# Patient Record
Sex: FEMALE | Race: BLACK OR AFRICAN AMERICAN | NOT HISPANIC OR LATINO | Employment: UNEMPLOYED | URBAN - METROPOLITAN AREA
[De-identification: names, ages, dates, MRNs, and addresses within clinical notes are randomized per-mention and may not be internally consistent; named-entity substitution may affect disease eponyms.]

---

## 2017-01-20 ENCOUNTER — OFFICE VISIT (OUTPATIENT)
Dept: OBSTETRICS AND GYNECOLOGY | Facility: CLINIC | Age: 27
End: 2017-01-20
Payer: COMMERCIAL

## 2017-01-20 VITALS
SYSTOLIC BLOOD PRESSURE: 122 MMHG | WEIGHT: 173.5 LBS | BODY MASS INDEX: 30.74 KG/M2 | DIASTOLIC BLOOD PRESSURE: 76 MMHG | HEIGHT: 63 IN

## 2017-01-20 DIAGNOSIS — Z87.42 HISTORY OF VAGINITIS: Primary | ICD-10-CM

## 2017-01-20 PROCEDURE — 99213 OFFICE O/P EST LOW 20 MIN: CPT | Mod: S$GLB,,, | Performed by: NURSE PRACTITIONER

## 2017-01-20 PROCEDURE — 1159F MED LIST DOCD IN RCRD: CPT | Mod: S$GLB,,, | Performed by: NURSE PRACTITIONER

## 2017-01-20 PROCEDURE — 99999 PR PBB SHADOW E&M-EST. PATIENT-LVL III: CPT | Mod: PBBFAC,,, | Performed by: NURSE PRACTITIONER

## 2017-01-20 PROCEDURE — 87480 CANDIDA DNA DIR PROBE: CPT

## 2017-01-20 NOTE — PROGRESS NOTES
"Corrine Daily is a 26 y.o. female  presents with complaint of vaginal itching and white discharge x 3 weeks. Denies itching or discharge today. Denies odor. Treated herself this week with OTC monistat and thinks it may have cleared up now. Reports seeing "white bumps" on the inside side wall of her vagina-not there anymore.  No other problems.     Past Medical History   Diagnosis Date    Anxiety     Depression      Past Surgical History   Procedure Laterality Date    Adenoidectomy  aroung [id number]     Social History   Substance Use Topics    Smoking status: Never Smoker    Smokeless tobacco: None    Alcohol use Yes      Comment: socially     Family History   Problem Relation Age of Onset    Adopted: Yes    Hypertension Neg Hx      OB History    Para Term  AB SAB TAB Ectopic Multiple Living   0 0 0 0 0 0 0 0 0 0             Visit Vitals    /76    Ht 5' 3" (1.6 m)    Wt 78.7 kg (173 lb 8 oz)    LMP 2017    BMI 30.73 kg/m2       ROS:  GENERAL: No fever, chills, fatigability or weight loss.  VULVAR: No pain, no lesions and no itching.  VAGINAL: No relaxation, no itching, no discharge, no abnormal bleeding and no lesions.  ABDOMEN: No abdominal pain. Denies nausea. Denies vomiting. No diarrhea. No constipation  BREAST: Denies pain. No lumps. No discharge.  URINARY: No incontinence, no nocturia, no frequency and no dysuria.  CARDIOVASCULAR: No chest pain. No shortness of breath. No leg cramps.  NEUROLOGICAL: No headaches. No vision changes.    PHYSICAL EXAM:  VULVA: normal appearing vulva with no masses, tenderness or lesions   VAGINA: normal appearing vagina with normal color. No abnormal discharge, no lesions   CERVIX: normal appearing cervix without discharge or lesions   UTERUS: uterus is normal size, shape, consistency and nontender   ADNEXA: normal adnexa in size, nontender and no masses    ASSESSMENT and PLAN:    ICD-10-CM ICD-9-CM    1. Vaginal itching L29.8 698.1 " Vaginosis Screen by DNA Probe     Affirm pending    Patient was counseled today on vaginitis prevention including :  a. avoiding feminine products such as deoderant soaps, body wash, bubble bath, douches, scented toilet paper, deoderant tampons or pads, feminine wipes, chronic pad use, etc.  b. avoiding other vulvovaginal irritants such as long hot baths, humidity, tight, synthetic clothing, chlorine and sitting around in wet bathing suits  c. wearing cotton underwear, avoiding thong underwear and no underwear to bed  d. taking showers instead of baths and use a hair dryer on cool setting afterwards to dry  e. wearing cotton to exercise and shower immediately after exercise and change clothes  f. using polyurethane condoms without spermicide if sexually active and symptoms are triggered by intercourse    FOLLOW UP: PRN lack of improvement.

## 2017-01-20 NOTE — MR AVS SNAPSHOT
[facility] [address]  [address]  [address]  [facility] [address]  Phone: [phone]  Fax: [phone]                  Corrine Daily   2017 3:20 PM   Office Visit    Description:  Female : 1990   Provider:  Debra Pino NP   Department:  [facility] [address]           Reason for Visit     Vaginitis                To Do List           Goals (5 Years of Data)     None      [facility]     [facility] On [facility] Nurse [facility] Line -  Assistance  Registered nurses in the [facility] On Call Center provide clinical advisement, health education, appointment booking, and other advisory services.  Call for this free service at [phone].             Medications           Message regarding Medications     Verify the changes and/or additions to your medication regime listed below are the same as discussed with your clinician today.  If any of these changes or additions are incorrect, please notify your healthcare provider.             Verify that the below list of medications is an accurate representation of the medications you are currently taking.  If none reported, the list may be blank. If incorrect, please contact your healthcare provider. Carry this list with you in case of emergency.           Current Medications     escitalopram oxalate (LEXAPRO) 10 MG tablet TK 1 T PO QD    levonorgestrel-ethinyl estradiol (TRIVORA, 28,) 50-30 (6)/75-40 (5)/125-30(10) per tablet Take 1 tablet by mouth once daily.    oxcarbazepine (TRILEPTAL) 150 MG Tab TK 1 T PO BID    oxcarbazepine (TRILEPTAL) 300 MG Tab TK 1 T PO QPM    azelastine (ASTELIN) 137 mcg (0.1 %) nasal spray 2 SPRAYS IEN BID    azithromycin (Z-SYL) 250 MG tablet TK UTD    ergocalciferol (ERGOCALCIFEROL) 50,000 unit Cap Take 1 capsule (50,000 Units total) by mouth every 30 days.    FLONASE ALLERGY RELIEF 50 mcg/actuation nasal spray 2 SPRAYS IEN BID FOR 30 DAYS MAY DECREASE TO ONCE A DAY AFTER 3 WEEKS     "hydrocodone-chlorpheniramine (TUSSIONEX) 10-8 mg/5 mL suspension     ibuprofen (ADVIL,MOTRIN) 800 MG tablet     NASONEX 50 mcg/actuation nasal spray     TRIVORA, 28, 50-30 (6)/75-40 (5)/125-30(10) per tablet TAKE ONE BY MOUTH EVERY DAY           Clinical Reference Information           Vital Signs - Last Recorded  Most recent update: 1/20/2017  3:30 PM by Michelle Ríos LPN    BP Ht Wt LMP BMI    122/76 5' 3" (1.6 m) 78.7 kg (173 lb 8 oz) 01/04/2017 30.73 kg/m2      Blood Pressure          Most Recent Value    BP  122/76      Allergies as of 1/20/2017     Penicillins    Prozac [Fluoxetine]      Immunizations Administered on Date of Encounter - 1/20/2017     None      "

## 2017-01-21 LAB
CANDIDA RRNA VAG QL PROBE: NEGATIVE
G VAGINALIS RRNA GENITAL QL PROBE: POSITIVE
T VAGINALIS RRNA GENITAL QL PROBE: NEGATIVE

## 2017-01-23 DIAGNOSIS — B96.89 BV (BACTERIAL VAGINOSIS): Primary | ICD-10-CM

## 2017-01-23 DIAGNOSIS — N76.0 BV (BACTERIAL VAGINOSIS): Primary | ICD-10-CM

## 2017-01-23 RX ORDER — TINIDAZOLE 500 MG/1
2 TABLET ORAL DAILY
Qty: 8 TABLET | Refills: 0 | Status: SHIPPED | OUTPATIENT
Start: 2017-01-23 | End: 2017-01-25

## 2017-10-22 DIAGNOSIS — Z30.41 ENCOUNTER FOR SURVEILLANCE OF CONTRACEPTIVE PILLS: ICD-10-CM

## 2017-10-23 RX ORDER — LEVONORGESTREL AND ETHINYL ESTRADIOL 6-5-10
KIT ORAL
Qty: 28 TABLET | Refills: 0 | Status: SHIPPED | OUTPATIENT
Start: 2017-10-23 | End: 2017-12-01 | Stop reason: SDUPTHER

## 2017-11-20 DIAGNOSIS — Z30.41 ENCOUNTER FOR SURVEILLANCE OF CONTRACEPTIVE PILLS: ICD-10-CM

## 2017-11-26 RX ORDER — LEVONORGESTREL AND ETHINYL ESTRADIOL 6-5-10
KIT ORAL
Qty: 28 TABLET | Refills: 1 | Status: SHIPPED | OUTPATIENT
Start: 2017-11-26 | End: 2018-01-09 | Stop reason: SDUPTHER

## 2017-12-01 ENCOUNTER — OFFICE VISIT (OUTPATIENT)
Dept: OBSTETRICS AND GYNECOLOGY | Facility: CLINIC | Age: 27
End: 2017-12-01
Payer: COMMERCIAL

## 2017-12-01 VITALS — HEIGHT: 63 IN | WEIGHT: 180.75 LBS | BODY MASS INDEX: 32.03 KG/M2

## 2017-12-01 DIAGNOSIS — Z30.41 ENCOUNTER FOR SURVEILLANCE OF CONTRACEPTIVE PILLS: ICD-10-CM

## 2017-12-01 DIAGNOSIS — Z30.9 ENCOUNTER FOR CONTRACEPTIVE MANAGEMENT, UNSPECIFIED TYPE: Primary | ICD-10-CM

## 2017-12-01 DIAGNOSIS — Z01.419 ENCOUNTER FOR GYNECOLOGICAL EXAMINATION WITHOUT ABNORMAL FINDING: ICD-10-CM

## 2017-12-01 DIAGNOSIS — Z11.3 SCREENING EXAMINATION FOR STD (SEXUALLY TRANSMITTED DISEASE): ICD-10-CM

## 2017-12-01 PROCEDURE — 99999 PR PBB SHADOW E&M-EST. PATIENT-LVL II: CPT | Mod: PBBFAC,,, | Performed by: OBSTETRICS & GYNECOLOGY

## 2017-12-01 PROCEDURE — 87591 N.GONORRHOEAE DNA AMP PROB: CPT

## 2017-12-01 PROCEDURE — 99395 PREV VISIT EST AGE 18-39: CPT | Mod: S$GLB,,, | Performed by: OBSTETRICS & GYNECOLOGY

## 2017-12-01 PROCEDURE — 87480 CANDIDA DNA DIR PROBE: CPT

## 2017-12-01 PROCEDURE — 87660 TRICHOMONAS VAGIN DIR PROBE: CPT

## 2017-12-01 RX ORDER — LEVONORGESTREL AND ETHINYL ESTRADIOL 6-5-10
1 KIT ORAL DAILY
Qty: 28 TABLET | Refills: 11 | Status: SHIPPED | OUTPATIENT
Start: 2017-12-01 | End: 2018-01-09

## 2017-12-01 NOTE — PROGRESS NOTES
HISTORY OF PRESENT ILLNESS:    Corrine Daily is a 27 y.o. female, , Patient's last menstrual period was 2017 (exact date).,  presents for a routine exam and has no complaints.  Patient is on current contraception without difficulties. Normal monthly cycles, no break through bleeding or bothersome side effects. She desires to continue current contraception.    Past Medical History:   Diagnosis Date    Anxiety     Depression        Past Surgical History:   Procedure Laterality Date    ADENOIDECTOMY  aroung [id number]     MEDICATIONS AND ALLERGIES:    Current Outpatient Prescriptions:     azelastine (ASTELIN) 137 mcg (0.1 %) nasal spray, 2 SPRAYS IEN BID, Disp: , Rfl: 0    escitalopram oxalate (LEXAPRO) 10 MG tablet, TK 1 T PO QD, Disp: , Rfl: 0    FLONASE ALLERGY RELIEF 50 mcg/actuation nasal spray, 2 SPRAYS IEN BID FOR 30 DAYS MAY DECREASE TO ONCE A DAY AFTER 3 WEEKS, Disp: , Rfl: 0    ibuprofen (ADVIL,MOTRIN) 800 MG tablet, , Disp: , Rfl: 3    levonorgestrel-ethinyl estradiol (TRIVORA, 28,) 50-30 (6)/75-40 (5)/125-30(10) per tablet, Take 1 tablet by mouth once daily., Disp: 28 tablet, Rfl: 11    oxcarbazepine (TRILEPTAL) 150 MG Tab, TK 1 T PO BID, Disp: , Rfl: 1    oxcarbazepine (TRILEPTAL) 300 MG Tab, TK 1 T PO QPM, Disp: , Rfl: 0    TRIVORA, 28, 50-30 (6)/75-40 (5)/125-30(10) per tablet, TAKE 1 TABLET BY MOUTH EVERY DAY, Disp: 28 tablet, Rfl: 1    azithromycin (Z-SYL) 250 MG tablet, TK UTD, Disp: , Rfl: 0    NASONEX 50 mcg/actuation nasal spray, , Disp: , Rfl: 0    Review of patient's allergies indicates:   Allergen Reactions    Penicillins      unknown    Prozac [fluoxetine] Other (See Comments)     Suicide thoughts       Family History   Problem Relation Age of Onset    Adopted: Yes    Hypertension Neg Hx     Breast cancer Neg Hx     Colon cancer Neg Hx     Ovarian cancer Neg Hx        Social History     Social History    Marital status: Single     Spouse name: N/A    Number  "of children: N/A    Years of education: N/A     Occupational History    Not on file.     Social History Main Topics    Smoking status: Never Smoker    Smokeless tobacco: Never Used    Alcohol use Yes      Comment: socially    Drug use:      Types: Marijuana    Sexual activity: Yes     Partners: Male     Birth control/ protection: OCP     Other Topics Concern    Not on file     Social History Narrative    No narrative on file     COMPREHENSIVE GYN HISTORY:  PAP History: Denies abnormal Paps.  Infection History: Denies STDs. Denies PID.  Benign History: Denies uterine fibroids. Denies ovarian cysts. Denies endometriosis. Denies other conditions.  Cancer History: Denies cervical cancer. Denies uterine cancer or hyperplasia. Denies ovarian cancer. Denies vulvar cancer or pre-cancer. Denies vaginal cancer or pre-cancer. Denies breast cancer. Denies colon cancer.  Sexual Activity History: Reports currently being sexually active  Menstrual History: Monthly. Mod then light flow.   Dysmenorrhea History: Reports mild dysmenorrhea.     ROS:  GENERAL: No weight changes. No swelling. No fatigue. No fever.  CARDIOVASCULAR: No chest pain. No shortness of breath. No leg cramps.   NEUROLOGICAL: No headaches. No vision changes.  BREASTS: No pain. No lumps. No discharge.  ABDOMEN: No pain. No nausea. No vomiting. No diarrhea. No constipation.  REPRODUCTIVE: No abnormal bleeding.   VULVA: No pain. No lesions. No itching.  VAGINA: No relaxation. No itching. No odor. No discharge. No lesions.  URINARY: No incontinence. No nocturia. No frequency. No dysuria.    Ht 5' 3" (1.6 m)   Wt 82 kg (180 lb 12.4 oz)   LMP 11/16/2017 (Exact Date)   BMI 32.02 kg/m²     PE:  APPEARANCE: Well nourished, well developed, in no acute distress.  AFFECT: WNL, alert and oriented x 3.  SKIN: No acne or hirsutism.  NECK: Neck symmetric, without masses or thyromegaly.  NODES: No inguinal, cervical, axillary or femoral lymph node enlargement.  CHEST: " Good respiratory effort.   ABDOMEN: Soft. No tenderness or masses. No hepatosplenomegaly. No hernias.  BREASTS: Symmetrical, no skin changes, visible lesions, palpable masses or nipple discharge bilaterally.  PELVIC: External female genitalia without lesions.  Female hair distribution. Adequate perineal body, Normal urethral meatus. Vagina moist and well rugated without lesions, white discharge.  No significant cystocele or rectocele present. Cervix pink without lesions, discharge or tenderness. Uterus is 4-6 week size, regular, mobile and nontender. Adnexa without masses or tenderness.  EXTREMITIES: No edema    DIAGNOSIS:  1. Encounter for contraceptive management, unspecified type    2. Encounter for gynecological examination without abnormal finding    3. Encounter for surveillance of contraceptive pills      PLAN:    Orders Placed This Encounter    levonorgestrel-ethinyl estradiol (TRIVORA, 28,) 50-30 (6)/75-40 (5)/125-30(10) per tablet     COUNSELING:  The patient was counseled today on:  -A.C.S. Pap and pelvic exam guidelines (pap every 3 years), recomendations for yearly mammogram;  -to follow up with her PCP for other health maintenance.    FOLLOW-UP with me annually.

## 2017-12-04 LAB
C TRACH DNA SPEC QL NAA+PROBE: DETECTED
CANDIDA RRNA VAG QL PROBE: NEGATIVE
G VAGINALIS RRNA GENITAL QL PROBE: POSITIVE
N GONORRHOEA DNA SPEC QL NAA+PROBE: NOT DETECTED
T VAGINALIS RRNA GENITAL QL PROBE: NEGATIVE

## 2017-12-05 ENCOUNTER — TELEPHONE (OUTPATIENT)
Dept: OBSTETRICS AND GYNECOLOGY | Facility: CLINIC | Age: 27
End: 2017-12-05

## 2017-12-05 DIAGNOSIS — N76.0 ACUTE VAGINITIS: Primary | ICD-10-CM

## 2017-12-05 RX ORDER — METRONIDAZOLE 7.5 MG/G
1 GEL VAGINAL DAILY
Qty: 1 G | Refills: 0 | Status: SHIPPED | OUTPATIENT
Start: 2017-12-05 | End: 2017-12-12

## 2017-12-05 RX ORDER — AZITHROMYCIN 500 MG/1
500 TABLET, FILM COATED ORAL ONCE
Qty: 4 TABLET | Refills: 0 | Status: SHIPPED | OUTPATIENT
Start: 2017-12-05 | End: 2017-12-05

## 2017-12-05 NOTE — TELEPHONE ENCOUNTER
Patient has Bacterial vaginosis.  Plan:  Metrogel Disp one tube, one applicatorful qhs for 5 nights sent to the pharmacy.    Please  patient on vaginitis prevention including :  a. avoiding feminine products such as deoderant soaps, body wash, bubble bath, douches, scented toilet paper, deoderant tampons or pads, feminine wipes, chronic pad use, etc.  b. avoiding other vulvovaginal irritants such as long hot baths, humidity, tight, synthetic clothing, chlorine and sitting around in wet bathing suits  c. wearing cotton underwear, avoiding thong underwear and no underwear to bed  d. taking showers instead of baths and use a hair dryer on cool setting afterwards to dry  e. wearing cotton to exercise and shower immediately after exercise and change clothes  f. using polyurethane condoms without spermicide if sexually active and symptoms are triggered by intercourse      +CT-Zithromax 250 mg disp 4 take all 4 pills at one time. No refills. Please contact patient to inform, enter pharmacy information and send back as a prescription request.     Spouse or partner should be evaluated & treated for infection. Patient should abstain form sex for 2 weeks after both she and partner have been treated.     Please schedule appt for patient to be seen to in 4 weeks for LA.     Chlamydia  Chlamydia is a very common sexually transmitted disease (STD). Most people do not have symptoms. Because of this, chlamydia may not be noticed until it causes severe problems. Left untreated, this infection can cause women and men to become sterile. This means they will not be able to have children.

## 2017-12-06 NOTE — TELEPHONE ENCOUNTER
I spoke to  pt and she started the medicine already. Pt is moving away and we scheduled her test to cure apt before she leaves

## 2017-12-18 ENCOUNTER — PATIENT MESSAGE (OUTPATIENT)
Dept: OBSTETRICS AND GYNECOLOGY | Facility: CLINIC | Age: 27
End: 2017-12-18

## 2018-01-09 ENCOUNTER — OFFICE VISIT (OUTPATIENT)
Dept: OBSTETRICS AND GYNECOLOGY | Facility: CLINIC | Age: 28
End: 2018-01-09
Payer: COMMERCIAL

## 2018-01-09 VITALS
HEIGHT: 63 IN | BODY MASS INDEX: 32.35 KG/M2 | SYSTOLIC BLOOD PRESSURE: 112 MMHG | WEIGHT: 182.56 LBS | DIASTOLIC BLOOD PRESSURE: 72 MMHG

## 2018-01-09 DIAGNOSIS — Z11.3 SCREEN FOR STD (SEXUALLY TRANSMITTED DISEASE): Primary | ICD-10-CM

## 2018-01-09 DIAGNOSIS — Z86.19 HISTORY OF CHLAMYDIA: ICD-10-CM

## 2018-01-09 DIAGNOSIS — Z30.41 ENCOUNTER FOR SURVEILLANCE OF CONTRACEPTIVE PILLS: ICD-10-CM

## 2018-01-09 PROCEDURE — 99213 OFFICE O/P EST LOW 20 MIN: CPT | Mod: S$GLB,,, | Performed by: NURSE PRACTITIONER

## 2018-01-09 PROCEDURE — 87491 CHLMYD TRACH DNA AMP PROBE: CPT

## 2018-01-09 PROCEDURE — 99999 PR PBB SHADOW E&M-EST. PATIENT-LVL III: CPT | Mod: PBBFAC,,, | Performed by: NURSE PRACTITIONER

## 2018-01-09 RX ORDER — LEVONORGESTREL AND ETHINYL ESTRADIOL 6-5-10
1 KIT ORAL DAILY
Qty: 28 TABLET | Refills: 10 | Status: SHIPPED | OUTPATIENT
Start: 2018-01-09 | End: 2018-09-17 | Stop reason: SDUPTHER

## 2018-01-09 NOTE — PROGRESS NOTES
CC: LA    HPI: Pt is a 27 y.o.  female who presents for chlamydia LA. Dx and treated for chlamydia in December. No problems since. Pt is moving to [place] this week for school. Requesting that her OCP rx be changed to a pharmacy there.       ROS:  GENERAL: Feeling well overall. Denies fever or chills.   SKIN: Denies rash or lesions.   HEAD: Denies head injury or headache.   NODES: Denies enlarged lymph nodes.   CHEST: Denies chest pain or shortness of breath.   CARDIOVASCULAR: Denies palpitations or left sided chest pain.   ABDOMEN: No abdominal pain, constipation, diarrhea, nausea, vomiting or rectal bleeding.   URINARY: No dysuria, hematuria, or burning on urination.  REPRODUCTIVE: See HPI.   BREASTS: Denies pain, lumps, or nipple discharge.   HEMATOLOGIC: No easy bruisability or excessive bleeding.   MUSCULOSKELETAL: Denies joint pain or swelling.   NEUROLOGIC: Denies syncope or weakness.   PSYCHIATRIC: Denies depression, anxiety or mood swings.    PE:   APPEARANCE: Well nourished, well developed, Black or  female in no acute distress.  VULVA: No lesions. Normal external female genitalia.  URETHRAL MEATUS: Normal size and location, no lesions, no prolapse.  URETHRA: No masses, tenderness, or prolapse.  VAGINA: Moist. No lesions or lacerations noted. No abnormal discharge present. No odor present.   CERVIX: No lesions or discharge. No cervical motion tenderness.   UTERUS: Normal size, regular shape, mobile, non-tender.  ADNEXA: No tenderness. No fullness or masses palpated in the adnexal regions.   ANUS PERINEUM: Normal.      Diagnosis:  1. Screen for STD (sexually transmitted disease)    2. History of chlamydia        Plan:     Orders Placed This Encounter    C. trachomatis/N. gonorrhoeae by AMP DNA Cervix     -affirm pending  -OCP rx sent to new pharmacy

## 2018-01-11 LAB
C TRACH DNA SPEC QL NAA+PROBE: NOT DETECTED
N GONORRHOEA DNA SPEC QL NAA+PROBE: NOT DETECTED

## 2018-09-17 DIAGNOSIS — Z30.41 ENCOUNTER FOR SURVEILLANCE OF CONTRACEPTIVE PILLS: ICD-10-CM

## 2018-11-02 ENCOUNTER — TELEPHONE (OUTPATIENT)
Dept: FAMILY MEDICINE | Facility: CLINIC | Age: 28
End: 2018-11-02

## 2018-11-02 NOTE — TELEPHONE ENCOUNTER
Pt advised of overdue pap smear, and states she moved and will schedule it with OBGYN [place].

## 2019-01-07 DIAGNOSIS — Z30.41 ENCOUNTER FOR SURVEILLANCE OF CONTRACEPTIVE PILLS: ICD-10-CM

## 2024-01-23 ENCOUNTER — OFFICE VISIT (OUTPATIENT)
Dept: OBSTETRICS AND GYNECOLOGY | Facility: CLINIC | Age: 34
End: 2024-01-23
Attending: OBSTETRICS & GYNECOLOGY
Payer: OTHER GOVERNMENT

## 2024-01-23 ENCOUNTER — LAB VISIT (OUTPATIENT)
Dept: LAB | Facility: OTHER | Age: 34
End: 2024-01-23
Attending: OBSTETRICS & GYNECOLOGY
Payer: OTHER GOVERNMENT

## 2024-01-23 VITALS
DIASTOLIC BLOOD PRESSURE: 70 MMHG | SYSTOLIC BLOOD PRESSURE: 110 MMHG | WEIGHT: 177.25 LBS | BODY MASS INDEX: 32.62 KG/M2 | HEIGHT: 62 IN

## 2024-01-23 DIAGNOSIS — Z01.419 WELL FEMALE EXAM WITH ROUTINE GYNECOLOGICAL EXAM: ICD-10-CM

## 2024-01-23 DIAGNOSIS — Z01.419 WELL FEMALE EXAM WITH ROUTINE GYNECOLOGICAL EXAM: Primary | ICD-10-CM

## 2024-01-23 DIAGNOSIS — Z97.5 BREAKTHROUGH BLEEDING ON NEXPLANON: ICD-10-CM

## 2024-01-23 DIAGNOSIS — N93.9 VAGINAL BLEEDING: ICD-10-CM

## 2024-01-23 DIAGNOSIS — N92.1 BREAKTHROUGH BLEEDING ON NEXPLANON: ICD-10-CM

## 2024-01-23 DIAGNOSIS — N93.9 ABNORMAL UTERINE BLEEDING: ICD-10-CM

## 2024-01-23 LAB
ALBUMIN SERPL BCP-MCNC: 4.3 G/DL (ref 3.5–5.2)
ALP SERPL-CCNC: 50 U/L (ref 55–135)
ALT SERPL W/O P-5'-P-CCNC: 17 U/L (ref 10–44)
ANION GAP SERPL CALC-SCNC: 4 MMOL/L (ref 8–16)
AST SERPL-CCNC: 25 U/L (ref 10–40)
B-HCG UR QL: NEGATIVE
BASOPHILS # BLD AUTO: 0.02 K/UL (ref 0–0.2)
BASOPHILS NFR BLD: 0.3 % (ref 0–1.9)
BILIRUB SERPL-MCNC: 0.5 MG/DL (ref 0.1–1)
BUN SERPL-MCNC: 11 MG/DL (ref 6–20)
CALCIUM SERPL-MCNC: 9.7 MG/DL (ref 8.7–10.5)
CHLORIDE SERPL-SCNC: 107 MMOL/L (ref 95–110)
CO2 SERPL-SCNC: 28 MMOL/L (ref 23–29)
CREAT SERPL-MCNC: 0.9 MG/DL (ref 0.5–1.4)
CTP QC/QA: YES
DIFFERENTIAL METHOD BLD: NORMAL
EOSINOPHIL # BLD AUTO: 0.2 K/UL (ref 0–0.5)
EOSINOPHIL NFR BLD: 2.5 % (ref 0–8)
ERYTHROCYTE [DISTWIDTH] IN BLOOD BY AUTOMATED COUNT: 12.6 % (ref 11.5–14.5)
EST. GFR  (NO RACE VARIABLE): >60 ML/MIN/1.73 M^2
GLUCOSE SERPL-MCNC: 88 MG/DL (ref 70–110)
HCT VFR BLD AUTO: 39.6 % (ref 37–48.5)
HGB BLD-MCNC: 12.8 G/DL (ref 12–16)
IMM GRANULOCYTES # BLD AUTO: 0.01 K/UL (ref 0–0.04)
IMM GRANULOCYTES NFR BLD AUTO: 0.2 % (ref 0–0.5)
LYMPHOCYTES # BLD AUTO: 2 K/UL (ref 1–4.8)
LYMPHOCYTES NFR BLD: 33.2 % (ref 18–48)
MCH RBC QN AUTO: 27.8 PG (ref 27–31)
MCHC RBC AUTO-ENTMCNC: 32.3 G/DL (ref 32–36)
MCV RBC AUTO: 86 FL (ref 82–98)
MONOCYTES # BLD AUTO: 0.6 K/UL (ref 0.3–1)
MONOCYTES NFR BLD: 10.1 % (ref 4–15)
NEUTROPHILS # BLD AUTO: 3.3 K/UL (ref 1.8–7.7)
NEUTROPHILS NFR BLD: 53.7 % (ref 38–73)
NRBC BLD-RTO: 0 /100 WBC
PLATELET # BLD AUTO: 279 K/UL (ref 150–450)
PMV BLD AUTO: 9.6 FL (ref 9.2–12.9)
POTASSIUM SERPL-SCNC: 4.4 MMOL/L (ref 3.5–5.1)
PROT SERPL-MCNC: 7.7 G/DL (ref 6–8.4)
RBC # BLD AUTO: 4.61 M/UL (ref 4–5.4)
SODIUM SERPL-SCNC: 139 MMOL/L (ref 136–145)
T4 FREE SERPL-MCNC: 1.1 NG/DL (ref 0.71–1.51)
TSH SERPL DL<=0.005 MIU/L-ACNC: 0.79 UIU/ML (ref 0.4–4)
WBC # BLD AUTO: 6.11 K/UL (ref 3.9–12.7)

## 2024-01-23 PROCEDURE — 36415 COLL VENOUS BLD VENIPUNCTURE: CPT | Performed by: OBSTETRICS & GYNECOLOGY

## 2024-01-23 PROCEDURE — 84443 ASSAY THYROID STIM HORMONE: CPT | Performed by: OBSTETRICS & GYNECOLOGY

## 2024-01-23 PROCEDURE — 99385 PREV VISIT NEW AGE 18-39: CPT | Mod: S$PBB,,, | Performed by: OBSTETRICS & GYNECOLOGY

## 2024-01-23 PROCEDURE — 84439 ASSAY OF FREE THYROXINE: CPT | Performed by: OBSTETRICS & GYNECOLOGY

## 2024-01-23 PROCEDURE — 85025 COMPLETE CBC W/AUTO DIFF WBC: CPT | Performed by: OBSTETRICS & GYNECOLOGY

## 2024-01-23 PROCEDURE — 88175 CYTOPATH C/V AUTO FLUID REDO: CPT | Performed by: PATHOLOGY

## 2024-01-23 PROCEDURE — 80053 COMPREHEN METABOLIC PANEL: CPT | Performed by: OBSTETRICS & GYNECOLOGY

## 2024-01-23 PROCEDURE — 88141 CYTOPATH C/V INTERPRET: CPT | Mod: ,,, | Performed by: PATHOLOGY

## 2024-01-23 PROCEDURE — 81025 URINE PREGNANCY TEST: CPT | Mod: PBBFAC | Performed by: OBSTETRICS & GYNECOLOGY

## 2024-01-23 PROCEDURE — 99999 PR PBB SHADOW E&M-NEW PATIENT-LVL III: CPT | Mod: PBBFAC,,, | Performed by: OBSTETRICS & GYNECOLOGY

## 2024-01-23 PROCEDURE — 99203 OFFICE O/P NEW LOW 30 MIN: CPT | Mod: PBBFAC | Performed by: OBSTETRICS & GYNECOLOGY

## 2024-01-23 PROCEDURE — 99999PBSHW POCT URINE PREGNANCY: Mod: PBBFAC,,,

## 2024-01-23 NOTE — PROGRESS NOTES
SUBJECTIVE:   33 y.o. female   for routine gyn exam. Patient's last menstrual period was 2023..  She has nexplanon sariah. Did well with bleeding the first year or so. But has had intermittent/ daily bleeding for about 8 weeks anow. - declines STD screen         Past Medical History:   Diagnosis Date    Anxiety     Depression      Past Surgical History:   Procedure Laterality Date    ADENOIDECTOMY  aroung [id number]     SECTION  2020    twins, PTD at 33 wga     Social History     Socioeconomic History    Marital status:    Tobacco Use    Smoking status: Some Days     Current packs/day: 0.25     Types: Cigarettes, Vaping with nicotine     Passive exposure: Current    Smokeless tobacco: Never   Substance and Sexual Activity    Alcohol use: Yes     Comment: socially    Drug use: Yes     Types: Marijuana    Sexual activity: Yes     Partners: Male     Birth control/protection: Implant     Comment: Nexplanon 2022     Family History   Adopted: Yes   Problem Relation Age of Onset    Hypertension Neg Hx     Breast cancer Neg Hx     Colon cancer Neg Hx     Ovarian cancer Neg Hx      OB History    Para Term  AB Living   3 1   1 2 2   SAB IAB Ectopic Multiple Live Births   1 1   1 2      # Outcome Date GA Lbr Shay/2nd Weight Sex Delivery Anes PTL Lv   3 IAB      TAB      2A  2020 33w0d   M CS-Unspec   CASIE      Complications: Fetal Intolerance   2B  2020 33w0d   M CS-Unspec   CASIE   1 SAB 2019     SAB            Current Outpatient Medications   Medication Sig Dispense Refill    escitalopram oxalate (LEXAPRO) 10 MG tablet TK 1 T PO QD  0     No current facility-administered medications for this visit.     Allergies: Penicillins and Prozac [fluoxetine]     ROS:  Constitutional: no weight loss, weight gain, fever, fatigue  Eyes:  No vision changes, glasses/contacts  ENT/Mouth: No ulcers, sinus problems, ears ringing, headache  Cardiovascular: No inability to lie  "flat, chest pain, exercise intolerance, swelling, heart palpitations  Respiratory: No wheezing, coughing blood, shortness of breath, or cough  Gastrointestinal: No diarrhea, bloody stool, nausea/vomiting, constipation, gas, hemorrhoids  Genitourinary: No blood in urine, painful urination, urgency of urination, frequency of urination, incomplete emptying, incontinence, +abnormal bleeding, painful periods, heavy periods, vaginal discharge, vaginal odor, painful intercourse, sexual problems, bleeding after intercourse.  Musculoskeletal: No muscle weakness  Skin/Breast: No painful breasts, nipple discharge, masses, rash, ulcers  Neurological: No passing out, seizures, numbness, headache  Endocrine: No diabetes, hypothyroid, hyperthyroid, hot flashes, hair loss, abnormal hair growth, acne  Psychiatric: No depression, crying  Hematologic: No bruises, bleeding, swollen lymph nodes, anemia.      OBJECTIVE:   The patient appears well, alert, oriented x 3, in no distress.  /70 (BP Location: Right arm, Patient Position: Sitting, BP Method: Medium (Manual))   Ht 5' 2" (1.575 m)   Wt 80.4 kg (177 lb 4 oz)   LMP 12/01/2023 Comment: Nexplanon 03/2022/outside facility  BMI 32.42 kg/m²   NECK: no thyromegaly, trachea midline  SKIN: no acne, striae, hirsutism  CHEST: CTAB  CV: RRR  BREAST EXAM: breasts appear normal, no suspicious masses, no skin or nipple changes or axillary nodes  ABDOMEN: no hernias, masses, or hepatosplenomegaly  GENITALIA: normal external genitalia, no erythema, no discharge  URETHRA: normal urethra, normal urethral meatus  VAGINA: Normal  CERVIX: no lesions or cervical motion tenderness  UTERUS: normal size, contour, position, consistency, mobility, non-tender  ADNEXA: no mass, fullness, tenderness      ASSESSMENT:   1. Well female exam with routine gynecological exam  Liquid-Based Pap Smear, Screening    CBC Auto Differential    TSH    Comprehensive Metabolic Panel    T4, Free      2. Breakthrough " bleeding on Nexplanon        3. Vaginal bleeding  POCT urine pregnancy      4. Abnormal uterine bleeding  CBC Auto Differential    TSH    Comprehensive Metabolic Panel    T4, Free    US Pelvis Comp with Transvag NON-OB (xpd          PLAN:   Orders Placed This Encounter    US Pelvis Comp with Transvag NON-OB (xpd    CBC Auto Differential    TSH    Comprehensive Metabolic Panel    T4, Free    POCT urine pregnancy    Liquid-Based Pap Smear, Screening     Discussed AUB, possible BTB on nexplanon. Plan labs and u/s. Then will f./u to discuss mgt options.  Discussed healthy lifestyle including regular exercise, healthy eating, etc.  Return to clinic in 1 year

## 2024-01-27 ENCOUNTER — HOSPITAL ENCOUNTER (OUTPATIENT)
Dept: RADIOLOGY | Facility: OTHER | Age: 34
Discharge: HOME | End: 2024-01-27
Attending: OBSTETRICS & GYNECOLOGY
Payer: OTHER GOVERNMENT

## 2024-01-27 DIAGNOSIS — N93.9 ABNORMAL UTERINE BLEEDING: ICD-10-CM

## 2024-01-27 PROCEDURE — 76856 US EXAM PELVIC COMPLETE: CPT | Mod: 26,,, | Performed by: RADIOLOGY

## 2024-01-27 PROCEDURE — 76830 TRANSVAGINAL US NON-OB: CPT | Mod: 26,,, | Performed by: RADIOLOGY

## 2024-01-27 PROCEDURE — 76830 TRANSVAGINAL US NON-OB: CPT | Mod: TC

## 2024-01-29 ENCOUNTER — PATIENT MESSAGE (OUTPATIENT)
Dept: OBSTETRICS AND GYNECOLOGY | Facility: CLINIC | Age: 34
End: 2024-01-29
Payer: OTHER GOVERNMENT

## 2024-01-29 LAB
FINAL PATHOLOGIC DIAGNOSIS: ABNORMAL
Lab: ABNORMAL

## 2024-01-31 ENCOUNTER — OFFICE VISIT (OUTPATIENT)
Dept: OBSTETRICS AND GYNECOLOGY | Facility: CLINIC | Age: 34
End: 2024-01-31
Attending: OBSTETRICS & GYNECOLOGY
Payer: OTHER GOVERNMENT

## 2024-01-31 DIAGNOSIS — Z97.5 BREAKTHROUGH BLEEDING ON NEXPLANON: Primary | ICD-10-CM

## 2024-01-31 DIAGNOSIS — N92.1 BREAKTHROUGH BLEEDING ON NEXPLANON: Primary | ICD-10-CM

## 2024-01-31 DIAGNOSIS — R87.615 UNSATISFACTORY CERVICAL PAPANICOLAOU SMEAR: ICD-10-CM

## 2024-01-31 PROBLEM — Z86.19 HISTORY OF CHLAMYDIA: Status: RESOLVED | Noted: 2018-01-09 | Resolved: 2024-01-31

## 2024-01-31 PROCEDURE — 99213 OFFICE O/P EST LOW 20 MIN: CPT | Mod: 95,,, | Performed by: OBSTETRICS & GYNECOLOGY

## 2024-01-31 NOTE — PROGRESS NOTES
The patient location is: [place]  The chief complaint leading to consultation is: AUB  Visit type: Virtual visit with synchronous audio and video  Total time spent with patient: 25 minutes  Each patient to whom he or she provides medical services by telemedicine is:  (1) informed of the relationship between the physician and patient and the respective role of any other health care provider with respect to management of the patient; and (2) notified that he or she may decline to receive medical services by telemedicine and may withdraw from such care at any time.      SUBJECTIVE:   33 y.o. female   for AUB. Patient's last menstrual period was 2023..  She has nexplanon sariah since 3-2022. Did well with bleeding the first year or so. But has had intermittent/ daily bleeding for about 8 weeks.  Bleeding has since stopped. - declines STD screen.  Needs contraception.  Unsatisfactory PAP . Normal CBC and TSH. Pelvic u/s shows 4 mm em stripe         Past Medical History:   Diagnosis Date    Anxiety     Depression      Past Surgical History:   Procedure Laterality Date    ADENOIDECTOMY  aroung [id number]     SECTION  2020    twins, PTD at 33 wga     Social History     Socioeconomic History    Marital status:    Tobacco Use    Smoking status: Some Days     Current packs/day: 0.25     Types: Cigarettes, Vaping with nicotine     Passive exposure: Current    Smokeless tobacco: Never   Substance and Sexual Activity    Alcohol use: Yes     Comment: socially    Drug use: Yes     Types: Marijuana    Sexual activity: Yes     Partners: Male     Birth control/protection: Implant     Comment: Nexplanon 2022     Family History   Adopted: Yes   Problem Relation Age of Onset    Hypertension Neg Hx     Breast cancer Neg Hx     Colon cancer Neg Hx     Ovarian cancer Neg Hx      OB History    Para Term  AB Living   3 1   1 2 2   SAB IAB Ectopic Multiple Live Births   1 1   1 2       # Outcome Date GA Lbr Shay/2nd Weight Sex Delivery Anes PTL Lv   3 IAB      TAB      2A  2020 33w0d   M CS-Unspec   CASIE      Complications: Fetal Intolerance   2B  2020 33w0d   M CS-Unspec   CASIE   1 2019     SAB            Current Outpatient Medications   Medication Sig Dispense Refill    escitalopram oxalate (LEXAPRO) 10 MG tablet TK 1 T PO QD  0     No current facility-administered medications for this visit.     Allergies: Penicillins and Prozac [fluoxetine]     ROS:  Constitutional: no weight loss, weight gain, fever, fatigue  Eyes:  No vision changes, glasses/contacts  ENT/Mouth: No ulcers, sinus problems, ears ringing, headache  Cardiovascular: No inability to lie flat, chest pain, exercise intolerance, swelling, heart palpitations  Respiratory: No wheezing, coughing blood, shortness of breath, or cough  Gastrointestinal: No diarrhea, bloody stool, nausea/vomiting, constipation, gas, hemorrhoids  Genitourinary: No blood in urine, painful urination, urgency of urination, frequency of urination, incomplete emptying, incontinence, +abnormal bleeding, painful periods, heavy periods, vaginal discharge, vaginal odor, painful intercourse, sexual problems, bleeding after intercourse.  Musculoskeletal: No muscle weakness  Skin/Breast: No painful breasts, nipple discharge, masses, rash, ulcers  Neurological: No passing out, seizures, numbness, headache  Endocrine: No diabetes, hypothyroid, hyperthyroid, hot flashes, hair loss, abnormal hair growth, ance  Psychiatric: No depression, crying  Hematologic: No bruises, bleeding, swollen lymph nodes, anemia.      OBJECTIVE:   The patient appears well, alert, oriented x 3, in no distress.      ASSESSMENT:   1. Breakthrough bleeding on Nexplanon        2. Unsatisfactory cervical Papanicolaou smear            PLAN:      Discussed BTB on Nexplanon. Reassured normal evaluation of AUB  Discussed contraception options and alternatives. Will continue Nexplanon  for now  Discussed unsatisfactory PAP and need for repeat PAP  Return to clinic for PAP

## 2024-03-21 ENCOUNTER — OFFICE VISIT (OUTPATIENT)
Dept: OBSTETRICS AND GYNECOLOGY | Facility: CLINIC | Age: 34
End: 2024-03-21
Attending: OBSTETRICS & GYNECOLOGY
Payer: OTHER GOVERNMENT

## 2024-03-21 VITALS
BODY MASS INDEX: 32.86 KG/M2 | SYSTOLIC BLOOD PRESSURE: 111 MMHG | HEIGHT: 62 IN | DIASTOLIC BLOOD PRESSURE: 73 MMHG | WEIGHT: 178.56 LBS

## 2024-03-21 DIAGNOSIS — R87.615 UNSATISFACTORY CERVICAL PAPANICOLAOU SMEAR: Primary | ICD-10-CM

## 2024-03-21 PROCEDURE — 87624 HPV HI-RISK TYP POOLED RSLT: CPT | Performed by: OBSTETRICS & GYNECOLOGY

## 2024-03-21 PROCEDURE — 99499 UNLISTED E&M SERVICE: CPT | Mod: S$PBB,,, | Performed by: OBSTETRICS & GYNECOLOGY

## 2024-03-21 PROCEDURE — 99213 OFFICE O/P EST LOW 20 MIN: CPT | Mod: PBBFAC | Performed by: OBSTETRICS & GYNECOLOGY

## 2024-03-21 PROCEDURE — 88175 CYTOPATH C/V AUTO FLUID REDO: CPT | Performed by: OBSTETRICS & GYNECOLOGY

## 2024-03-21 PROCEDURE — 99999 PR PBB SHADOW E&M-EST. PATIENT-LVL III: CPT | Mod: PBBFAC,,, | Performed by: OBSTETRICS & GYNECOLOGY

## 2024-03-25 NOTE — PROGRESS NOTES
SUBJECTIVE:   33 y.o. female   for repeat PAP. Patient's last menstrual period was 2024..  Last PAP was unsatisfactory due to blood present         Past Medical History:   Diagnosis Date    Anxiety     Depression      Past Surgical History:   Procedure Laterality Date    ADENOIDECTOMY  aroung [id number]     SECTION  2020    twins, PTD at 35 wga     Social History     Socioeconomic History    Marital status:    Tobacco Use    Smoking status: Some Days     Current packs/day: 0.25     Types: Cigarettes, Vaping with nicotine     Passive exposure: Current    Smokeless tobacco: Never   Substance and Sexual Activity    Alcohol use: Yes     Comment: socially    Drug use: Yes     Types: Marijuana    Sexual activity: Yes     Partners: Male     Birth control/protection: Implant     Comment: Nexplanon 2022     Family History   Adopted: Yes   Problem Relation Age of Onset    Hypertension Neg Hx     Breast cancer Neg Hx     Colon cancer Neg Hx     Ovarian cancer Neg Hx      OB History    Para Term  AB Living   3 1   1 2 2   SAB IAB Ectopic Multiple Live Births   1 1   1 2      # Outcome Date GA Lbr Shay/2nd Weight Sex Delivery Anes PTL Lv   3 IAB      TAB      2A   33w0d   M CS-Unspec   CASIE      Complications: Fetal Intolerance   2B   33w0d   M CS-Unspec   CASIE   1 SAB 2019     SAB            Current Outpatient Medications   Medication Sig Dispense Refill    escitalopram oxalate (LEXAPRO) 10 MG tablet TK 1 T PO QD  0     No current facility-administered medications for this visit.     Allergies: Penicillins and Prozac [fluoxetine]     ROS:  Constitutional: no weight loss, weight gain, fever, fatigue  Eyes:  No vision changes, glasses/contacts  ENT/Mouth: No ulcers, sinus problems, ears ringing, headache  Cardiovascular: No inability to lie flat, chest pain, exercise intolerance, swelling, heart palpitations  Respiratory: No wheezing, coughing blood,  "shortness of breath, or cough  Gastrointestinal: No diarrhea, bloody stool, nausea/vomiting, constipation, gas, hemorrhoids  Genitourinary: No blood in urine, painful urination, urgency of urination, frequency of urination, incomplete emptying, incontinence, abnormal bleeding, painful periods, heavy periods, vaginal discharge, vaginal odor, painful intercourse, sexual problems, bleeding after intercourse.  Musculoskeletal: No muscle weakness  Skin/Breast: No painful breasts, nipple discharge, masses, rash, ulcers  Neurological: No passing out, seizures, numbness, headache  Endocrine: No diabetes, hypothyroid, hyperthyroid, hot flashes, hair loss, abnormal hair growth, ance  Psychiatric: No depression, crying  Hematologic: No bruises, bleeding, swollen lymph nodes, anemia.      OBJECTIVE:   The patient appears well, alert, oriented x 3, in no distress.  /73   Ht 5' 2" (1.575 m)   Wt 81 kg (178 lb 9.2 oz)   LMP 01/04/2024   BMI 32.66 kg/m²   ABDOMEN: no hernias, masses, or hepatosplenomegaly  GENITALIA: normal external genitalia, no erythema, no discharge  URETHRA: normal urethra, normal urethral meatus  VAGINA: Normal  CERVIX: no lesions or cervical motion tenderness      ASSESSMENT:   1. Unsatisfactory cervical Papanicolaou smear  CANCELED: Liquid-Based Pap Smear, Screening          PLAN:   Orders Placed This Encounter    Pap Smear, Thin Prep with Reflex to HPV     Discussed unsatisfactory PAP and repeat PAP  Return to clinic prn    "

## 2024-03-29 LAB
CLINICAL INFO: ABNORMAL
CYTO CVX: ABNORMAL
CYTOLOGIST CVX/VAG CYTO: ABNORMAL
CYTOLOGIST CVX/VAG CYTO: ABNORMAL
CYTOLOGY CMNT CVX/VAG CYTO-IMP: ABNORMAL
CYTOLOGY PAP THIN PREP EXPLANATION: ABNORMAL
DATE OF PREVIOUS PAP: ABNORMAL
DATE PREVIOUS BX: NO
GEN CATEG CVX/VAG CYTO-IMP: ABNORMAL
HPV I/H RISK 4 DNA CVX QL NAA+PROBE: NOT DETECTED
LMP START DATE: ABNORMAL
MICROORGANISM CVX/VAG CYTO: ABNORMAL
PATHOLOGIST CVX/VAG CYTO: ABNORMAL
SERVICE CMNT-IMP: ABNORMAL
SPECIMEN SOURCE CVX/VAG CYTO: ABNORMAL
STAT OF ADQ CVX/VAG CYTO-IMP: ABNORMAL

## 2024-08-01 ENCOUNTER — ON-DEMAND VIRTUAL (OUTPATIENT)
Dept: URGENT CARE | Facility: CLINIC | Age: 34
End: 2024-08-01
Payer: OTHER GOVERNMENT

## 2024-08-01 DIAGNOSIS — U07.1 COVID: Primary | ICD-10-CM

## 2024-08-01 PROCEDURE — 99212 OFFICE O/P EST SF 10 MIN: CPT | Mod: 95,,, | Performed by: NURSE PRACTITIONER

## 2024-08-01 NOTE — PROGRESS NOTES
Subjective:      Patient ID: Corrine Daily is a 33 y.o. female.    Vitals:  vitals were not taken for this visit.     Chief Complaint: URI      Visit Type: TELE AUDIOVISUAL    Present with the patient at the time of consultation: TELEMED PRESENT WITH PATIENT: None        Past Medical History:   Diagnosis Date    Anxiety     Depression      Past Surgical History:   Procedure Laterality Date    ADENOIDECTOMY  aroung [id number]     SECTION  2020    twins, PTD at 35 wga     Review of patient's allergies indicates:   Allergen Reactions    Penicillins      unknown    Prozac [fluoxetine] Other (See Comments)     Suicide thoughts     Current Outpatient Medications on File Prior to Visit   Medication Sig Dispense Refill    escitalopram oxalate (LEXAPRO) 10 MG tablet TK 1 T PO QD  0     No current facility-administered medications on file prior to visit.     Family History   Adopted: Yes   Problem Relation Name Age of Onset    Hypertension Neg Hx      Breast cancer Neg Hx      Colon cancer Neg Hx      Ovarian cancer Neg Hx             Ohs Peq Odvv Intake    2024  3:11 PM CDT - Filed by Patient   What is your current physical address in the event of a medical emergency? [address]   Are you able to take your vital signs? No   Please attach any relevant images or files          32 yo female with c/o positive covid test. She states she tested positive this morning. She is seeking paxlovid. She states she lives with an elderly relative.         Constitution: Negative.   HENT: Negative.     Cardiovascular: Negative.    Respiratory: Negative.     Gastrointestinal: Negative.    Endocrine: negative.   Genitourinary: Negative.  Negative for frequency and urgency.   Musculoskeletal: Negative.    Skin: Negative.    Allergic/Immunologic: Negative.    Neurological: Negative.    Hematologic/Lymphatic: Negative.    Psychiatric/Behavioral: Negative.          Objective:   The physical exam was conducted  virtually.  LOCATION OF PATIENT home  Physical Exam   Constitutional: She is oriented to person, place, and time. She appears well-developed.   HENT:   Head: Normocephalic and atraumatic.   Ears:   Right Ear: Hearing, tympanic membrane and external ear normal.   Left Ear: Hearing, tympanic membrane and external ear normal.   Nose: Nose normal.   Mouth/Throat: Uvula is midline, oropharynx is clear and moist and mucous membranes are normal.   Eyes: Conjunctivae and EOM are normal. Pupils are equal, round, and reactive to light.   Neck: Neck supple.   Cardiovascular: Normal rate.   Pulmonary/Chest: Effort normal and breath sounds normal.   Musculoskeletal: Normal range of motion.         General: Normal range of motion.   Neurological: She is alert and oriented to person, place, and time.   Skin: Skin is warm.   Psychiatric: Her behavior is normal. Thought content normal.   Nursing note and vitals reviewed.      Assessment:     1. COVID        Plan:     I explained to patient that she does not meet criteria for paxlovid. She has no pre exsisting medical conditions and 34 yo         The CDC has recently changed guideline to include patient that are Covid-19 positive should stay home until theyve been fever-free without medication for at least 24 hours and their symptoms have been improving for 24 hours.      Important home care recommendations include: monitor your symptoms, if you have trouble breathing please go to the ER. Stay in a separate room from other household members, if possible.  Use a separate bathroom, if possible.  Avoid contact with other members of the household and pets.  Don't share personal household items, like cups, towels, and utensils.  Wear a mask when around other people if able.  Please refer to CDC's websit for more information about what to do if you you're sick and how to notify your contacts.    Stay home except to get medical care. Most people with COVID-19 have mild illness and can  recover at home without medical care. Do not leave your home, except to get medical care. Do not visit public areas. Get rest and stay hydrated. Call before you get medical care. Be sure to get care if you have trouble breathing, or have any other emergency warning signs, or if you think it is an emergency.  Avoid public transportation, ride-sharing, or taxis.  Separate yourself from other people.  As much as possible, stay in a specific room and away from other people and pets in your home. If possible, you should use a separate bathroom. If you need to be around other people or animals in or outside of the home, wear a mask.     If someone is showing any of these signs, seek emergency medical care immediately:  Trouble breathing  Persistent pain or pressure in the chest  New confusion  Inability to wake or stay awake  Pale, gray, or blue-colored skin, lips, or nail beds, depending on skin tone  *This list is not all possible symptoms. Please call your medical provider for any other symptoms that are severe or concerning to you.    Call ahead before visiting your doctor  Call ahead. Many medical visits for routine care are being postponed or done by phone or telemedicine.  If you have a medical appointment that cannot be postponed, call your doctors office, and tell them you have or may have COVID-19. This will help the office protect themselves and other patients.    You dont need to wear the mask if you are alone. If you cant put on a mask (because of trouble breathing, for example), cover your coughs and sneezes in some other way. Try to stay at least 6 feet away from other people. This will help protect the people around you.  Masks should not be placed on young children under age 2 years, anyone who has trouble breathing, or anyone who is not able to remove the mask without help.    Cover your coughs and sneezes  Cover your mouth and nose with a tissue when you cough or sneeze.  Throw away used tissues in a  lined trash can.  Immediately wash your hands with soap and water for at least 20 seconds. If soap and water are not available, clean your hands with an alcohol-based hand  that contains at least 60% alcohol.  hands wash light icon  Clean your hands often  Wash your hands often with soap and water for at least 20 seconds. This is especially important after blowing your nose, coughing, or sneezing; going to the bathroom; and before eating or preparing food.  Use hand  if soap and water are not available. Use an alcohol-based hand  with at least 60% alcohol, covering all surfaces of your hands and rubbing them together until they feel dry.  Soap and water are the best option, especially if hands are visibly dirty.  Avoid touching your eyes, nose, and mouth with unwashed hands.  Handwashing Tips  Avoid sharing personal household items  Do not share dishes, drinking glasses, cups, eating utensils, towels, or bedding with other people in your home.  Wash these items thoroughly after using them with soap and water or put in the .  spraybottle icon  Clean all high-touch surfaces everyday  Clean and disinfect high-touch surfaces in your sick room and bathroom; wear disposable gloves. Let someone else clean and disinfect surfaces in common areas, but you should clean your bedroom and bathroom, if possible.  If a caregiver or other person needs to clean and disinfect a sick persons bedroom or bathroom, they should do so on an as-needed basis. The caregiver/other person should wear a mask and disposable gloves prior to cleaning. They should wait as long as possible after the person who is sick has used the bathroom before coming in to clean and use the bathroom.  High-touch surfaces include phones, remote controls, counters, tabletops, doorknobs, bathroom fixtures, toilets, keyboards, tablets, and bedside tables.    Clean and disinfect areas that may have blood, stool, or body fluids  on them.  Use household  and disinfectants. Clean the area or item with soap and water or another detergent if it is dirty. Then, use a household disinfectant.  Be sure to follow the instructions on the label to ensure safe and effective use of the product. Many products recommend keeping the surface wet for several minutes to ensure germs are killed. Many also recommend precautions such as wearing gloves and making sure you have good ventilation during use of the product.  Use a product from EPAs List N: Disinfectants for Coronavirus (COVID-19)external icon.  Complete Disinfection Guidance      PLEASE READ YOUR DISCHARGE INSTRUCTIONS ENTIRELY AS IT CONTAINS IMPORTANT INFORMATION.    Please drink plenty of fluids.    Please get plenty of rest.    If not allergic, please take over the counter Tylenol (Acetaminophen) and/or Motrin (Ibuprofen) as directed for control of muscle aches, pain, and/or fever.    Please go to the Emergency Department for any shortness of breath or difficulty breathing.    For congestion, runny nose, or post nasal drip please take an over the counter antihistamine medication (Claritin/Zyrtec/Allegra) of your choice as directed or try an over the counter decongestant like Sudafed. You buy this behind the pharmacy counter.  You can also buy combination OTC antihistamine plus decongestant medications such at Zyrtec-D or Claritin-D.  Do not take decongestant if you suffer from high blood pressure.    For cough, you may be prescribed medication.  Take as prescribed.  If you were not prescribed any medication, you can use over the counter cough medications such as Robitussin.  If you have chest congestion you can consider using over the counter Mucinex but make sure you drink plenty of water to encourage mobilization of the secretions.    If you do have high blood pressure or palpitations, it is safe to take Coricidin HBP for relief of sinus symptoms.    Sore throat recommendations: Warm  fluids (tea with honey, soup, broth), warm salt water gargles, throat lozenges, rest, hydration.    If you  smoke, please stop smoking.    You must understand that you've received an Urgent Care treatment only and that you may be released before all of your medical problems are known or treated. You, the patient, will arrange for follow up as instructed. If your symptoms worsen or fail to improve you should go to the Emergency Room.    COVID

## 2024-10-09 ENCOUNTER — TELEPHONE (OUTPATIENT)
Dept: OBSTETRICS AND GYNECOLOGY | Facility: CLINIC | Age: 34
End: 2024-10-09
Payer: COMMERCIAL

## 2024-10-09 NOTE — TELEPHONE ENCOUNTER
----- Message from Apolonia sent at 10/9/2024 11:54 AM CDT -----  Contact: [phone]  Pt is requesting a appt with provider, she is a old pt of provider and would like to schedule to discuss HPV shot as well as other things     Please call pt and advise

## 2024-10-23 ENCOUNTER — TELEPHONE (OUTPATIENT)
Dept: OBSTETRICS AND GYNECOLOGY | Facility: CLINIC | Age: 34
End: 2024-10-23
Payer: COMMERCIAL

## 2024-11-04 ENCOUNTER — OFFICE VISIT (OUTPATIENT)
Dept: OBSTETRICS AND GYNECOLOGY | Facility: CLINIC | Age: 34
End: 2024-11-04
Payer: COMMERCIAL

## 2024-11-04 VITALS — BODY MASS INDEX: 31.37 KG/M2 | WEIGHT: 171.5 LBS

## 2024-11-04 DIAGNOSIS — Z97.5 BREAKTHROUGH BLEEDING ON NEXPLANON: ICD-10-CM

## 2024-11-04 DIAGNOSIS — N92.1 BREAKTHROUGH BLEEDING ON NEXPLANON: ICD-10-CM

## 2024-11-04 DIAGNOSIS — N93.9 ABNORMAL UTERINE BLEEDING (AUB): Primary | ICD-10-CM

## 2024-11-04 LAB
BILIRUB UR QL STRIP: NEGATIVE
CLARITY UR: CLEAR
COLOR UR: YELLOW
GLUCOSE UR QL STRIP: NEGATIVE
HGB UR QL STRIP: ABNORMAL
KETONES UR QL STRIP: NEGATIVE
LEUKOCYTE ESTERASE UR QL STRIP: NEGATIVE
NITRITE UR QL STRIP: NEGATIVE
PH UR STRIP: 6 [PH] (ref 5–8)
PROT UR QL STRIP: NEGATIVE
SP GR UR STRIP: 1.02 (ref 1–1.03)
URN SPEC COLLECT METH UR: ABNORMAL
UROBILINOGEN UR STRIP-ACNC: NEGATIVE EU/DL

## 2024-11-04 PROCEDURE — 3008F BODY MASS INDEX DOCD: CPT | Mod: CPTII,S$GLB,,

## 2024-11-04 PROCEDURE — 0352U VAGINOSIS SCREEN BY DNA PROBE: CPT

## 2024-11-04 PROCEDURE — 99213 OFFICE O/P EST LOW 20 MIN: CPT | Mod: S$GLB,,,

## 2024-11-04 PROCEDURE — 1159F MED LIST DOCD IN RCRD: CPT | Mod: CPTII,S$GLB,,

## 2024-11-04 PROCEDURE — 99999 PR PBB SHADOW E&M-EST. PATIENT-LVL II: CPT | Mod: PBBFAC,,,

## 2024-11-04 PROCEDURE — 81003 URINALYSIS AUTO W/O SCOPE: CPT

## 2024-11-04 PROCEDURE — 87491 CHLMYD TRACH DNA AMP PROBE: CPT

## 2024-11-04 RX ORDER — ESTRADIOL 2 MG/1
2 TABLET ORAL DAILY
Qty: 10 TABLET | Refills: 1 | Status: SHIPPED | OUTPATIENT
Start: 2024-11-04 | End: 2024-11-14

## 2024-11-04 RX ORDER — ALPRAZOLAM 0.5 MG/1
TABLET ORAL
COMMUNITY
Start: 2023-11-22

## 2024-11-04 NOTE — PROGRESS NOTES
Subjective     Patient ID: Corrine Daily is a 33 y.o. female.    Chief Complaint:  No chief complaint on file.      History of Present Illness  HPI  MsCharles Daily is a 32 yo  that presents with complaints of breakthrough bleeding with the nexplanon. Pt states she had 1 week of bleeding in the first week of October and then began bleeding again 6 days ago. She reports bleeding is like a normal period flow. She has been wearing tampons and pads. She also reports odor. States she does use scented soaps. 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       GYN & OB History  Patient's last menstrual period was 10/15/2024.   Date of Last Pap: 3/29/2024    OB History    Para Term  AB Living   3 1   1 2 2   SAB IAB Ectopic Multiple Live Births   1 1   1 2      # Outcome Date GA Lbr Shay/2nd Weight Sex Type Anes PTL Lv   3 IAB      TAB      2A  2020 33w0d   M CS-Unspec   CASIE      Complications: Fetal Intolerance   2B  2020 33w0d   M CS-Unspec   CASIE   1 SAB 2019     SAB          Review of Systems  Review of Systems   Constitutional:  Negative for activity change and appetite change.   Respiratory:  Negative for shortness of breath.    Cardiovascular:  Negative for chest pain.   Gastrointestinal:  Negative for abdominal pain, diarrhea and nausea.   Genitourinary:  Positive for menstrual problem. Negative for bladder incontinence, decreased libido, dysmenorrhea, dyspareunia, dysuria, flank pain, frequency, genital sores, hematuria, hot flashes, menorrhagia, pelvic pain, urgency, vaginal bleeding, vaginal discharge, vaginal pain, urinary incontinence, postcoital bleeding, postmenopausal bleeding, vaginal dryness and vaginal odor.   Integumentary:  Negative for breast tenderness.   Neurological:  Negative for headaches.   Breast: Negative for breast self exam and tenderness         Objective   Physical Exam:   Constitutional: She is  oriented to person, place, and time. She appears well-developed and well-nourished.    HENT:   Head: Normocephalic.      Cardiovascular:       Exam reveals no clubbing.        Pulmonary/Chest: Effort normal and breath sounds normal. Right breast exhibits no nipple discharge, no skin change, no tenderness, no bleeding and no swelling. Left breast exhibits no nipple discharge, no skin change, no tenderness, no bleeding and no swelling. Breasts are symmetrical.        Abdominal: Soft. There is no abdominal tenderness. Hernia confirmed negative in the right inguinal area and confirmed negative in the left inguinal area.     Genitourinary:    Inguinal canal, uterus, right adnexa, left adnexa and rectum normal.   Rectum:      No tenderness.      Pelvic exam was performed with patient supine.   The external female genitalia was normal.   No external genitalia lesions identified,Genitalia hair distrobution normal .     Labial bartholins normal.Cervix is normal. There is bleeding in the vagina. No vaginal discharge or tenderness in the vagina.    No signs of injury in the vagina.   Vagina was moist.Cervix exhibits no discharge and no tenderness. Uterus is not tender. Normal urethral meatus.Urethra findings: no tendernessBladder findings: no bladder tenderness          Musculoskeletal: Normal range of motion and moves all extremeties.      Lymphadenopathy: No inguinal adenopathy noted on the right or left side.    Neurological: She is alert and oriented to person, place, and time.    Skin: Skin is warm. Nails show no clubbing.    Psychiatric: She has a normal mood and affect. Her behavior is normal. Judgment and thought content normal.            Assessment and Plan     1. Abnormal uterine bleeding (AUB)    2. Breakthrough bleeding on Nexplanon             Plan:  1. Abnormal uterine bleeding (AUB) (Primary)  - Vaginosis Screen by DNA Probe  - C. trachomatis/N. gonorrhoeae by AMP DNA  - Urinalysis, Reflex to Urine Culture  Urine, Clean Catch  - POCT Urine Pregnancy  - estradioL (ESTRACE) 2 MG tablet; Take 1 tablet (2 mg total) by mouth once daily. for 10 days  Dispense: 10 tablet; Refill: 1    2. Breakthrough bleeding on Nexplanon  - Vaginosis Screen by DNA Probe  - C. trachomatis/N. gonorrhoeae by AMP DNA  - Urinalysis, Reflex to Urine Culture Urine, Clean Catch  - POCT Urine Pregnancy  - estradioL (ESTRACE) 2 MG tablet; Take 1 tablet (2 mg total) by mouth once daily. for 10 days  Dispense: 10 tablet; Refill: 1     Follow up PRN

## 2024-11-07 LAB
BACTERIAL VAGINOSIS DNA: DETECTED
C TRACH DNA SPEC QL NAA+PROBE: NOT DETECTED
CANDIDA GLABRATA/KRUSEI: NOT DETECTED
CANDIDA RRNA VAG QL PROBE: NOT DETECTED
N GONORRHOEA DNA SPEC QL NAA+PROBE: NOT DETECTED
TRICHOMONAS VAGINALIS: NOT DETECTED

## 2024-11-08 ENCOUNTER — PATIENT MESSAGE (OUTPATIENT)
Dept: OBSTETRICS AND GYNECOLOGY | Facility: CLINIC | Age: 34
End: 2024-11-08
Payer: COMMERCIAL

## 2024-11-08 DIAGNOSIS — N76.0 BV (BACTERIAL VAGINOSIS): Primary | ICD-10-CM

## 2024-11-08 DIAGNOSIS — B96.89 BV (BACTERIAL VAGINOSIS): Primary | ICD-10-CM

## 2024-11-08 RX ORDER — METRONIDAZOLE 500 MG/1
500 TABLET ORAL 2 TIMES DAILY
Qty: 14 TABLET | Refills: 0 | Status: SHIPPED | OUTPATIENT
Start: 2024-11-08 | End: 2024-11-15

## 2025-01-15 ENCOUNTER — OFFICE VISIT (OUTPATIENT)
Dept: OBSTETRICS AND GYNECOLOGY | Facility: CLINIC | Age: 35
End: 2025-01-15
Payer: COMMERCIAL

## 2025-01-15 VITALS
BODY MASS INDEX: 31.8 KG/M2 | WEIGHT: 172.81 LBS | SYSTOLIC BLOOD PRESSURE: 121 MMHG | HEIGHT: 62 IN | DIASTOLIC BLOOD PRESSURE: 88 MMHG

## 2025-01-15 DIAGNOSIS — Z30.9 ENCOUNTER FOR CONTRACEPTIVE MANAGEMENT, UNSPECIFIED TYPE: ICD-10-CM

## 2025-01-15 DIAGNOSIS — Z01.419 ENCOUNTER FOR GYNECOLOGICAL EXAMINATION WITHOUT ABNORMAL FINDING: Primary | ICD-10-CM

## 2025-01-15 PROCEDURE — 1159F MED LIST DOCD IN RCRD: CPT | Mod: CPTII,S$GLB,, | Performed by: OBSTETRICS & GYNECOLOGY

## 2025-01-15 PROCEDURE — 87624 HPV HI-RISK TYP POOLED RSLT: CPT | Performed by: OBSTETRICS & GYNECOLOGY

## 2025-01-15 PROCEDURE — 99395 PREV VISIT EST AGE 18-39: CPT | Mod: S$GLB,,, | Performed by: OBSTETRICS & GYNECOLOGY

## 2025-01-15 PROCEDURE — 3074F SYST BP LT 130 MM HG: CPT | Mod: CPTII,S$GLB,, | Performed by: OBSTETRICS & GYNECOLOGY

## 2025-01-15 PROCEDURE — 3079F DIAST BP 80-89 MM HG: CPT | Mod: CPTII,S$GLB,, | Performed by: OBSTETRICS & GYNECOLOGY

## 2025-01-15 PROCEDURE — 1160F RVW MEDS BY RX/DR IN RCRD: CPT | Mod: CPTII,S$GLB,, | Performed by: OBSTETRICS & GYNECOLOGY

## 2025-01-15 PROCEDURE — 99999 PR PBB SHADOW E&M-EST. PATIENT-LVL III: CPT | Mod: PBBFAC,,, | Performed by: OBSTETRICS & GYNECOLOGY

## 2025-01-15 PROCEDURE — 88175 CYTOPATH C/V AUTO FLUID REDO: CPT | Performed by: OBSTETRICS & GYNECOLOGY

## 2025-01-15 PROCEDURE — 3008F BODY MASS INDEX DOCD: CPT | Mod: CPTII,S$GLB,, | Performed by: OBSTETRICS & GYNECOLOGY

## 2025-01-15 NOTE — PROGRESS NOTES
HISTORY OF PRESENT ILLNESS:    Corrine Daily is a 34 y.o. female, , No LMP recorded (lmp unknown). Patient has had an implant.,  presents for a routine exam and has no complaints.    She has nexplanon sariah since 3-2022. Did well with bleeding the first year or so. But has had intermittent/ daily bleeding for about 8 weeks.  Bleeding has since stopped. - declines STD screen.  Needs contraception.  Unsatisfactory PAP . Normal CBC and TSH. Pelvic u/s shows 4 mm em stripe     Now cycles occur 4-5 times per year lasting 14-21 days using 5-6 tampons per day     Past Medical History:   Diagnosis Date    Anxiety     Depression        Past Surgical History:   Procedure Laterality Date    ADENOIDECTOMY  aroung [id number]     SECTION  2020    twins, PTD at 33 wga       MEDICATIONS AND ALLERGIES:      Current Outpatient Medications:     ALPRAZolam (XANAX) 0.5 MG tablet, , Disp: , Rfl:     ARIPiprazole 10 mg tbsp, , Disp: , Rfl:     escitalopram oxalate (LEXAPRO) 10 MG tablet, TK 1 T PO QD, Disp: , Rfl: 0    estradioL (ESTRACE) 2 MG tablet, Take 1 tablet (2 mg total) by mouth once daily. for 10 days, Disp: 10 tablet, Rfl: 1    Review of patient's allergies indicates:   Allergen Reactions    Penicillins      unknown    Prozac [fluoxetine] Other (See Comments)     Suicide thoughts     Family History   Adopted: Yes   Problem Relation Name Age of Onset    Hypertension Neg Hx      Breast cancer Neg Hx      Colon cancer Neg Hx      Ovarian cancer Neg Hx         Social History     Socioeconomic History    Marital status:    Tobacco Use    Smoking status: Some Days     Current packs/day: 0.25     Types: Cigarettes, Vaping with nicotine     Passive exposure: Current    Smokeless tobacco: Never   Substance and Sexual Activity    Alcohol use: Yes     Comment: socially    Drug use: Yes     Types: Marijuana    Sexual activity: Yes     Partners: Male     Birth control/protection: Implant     Comment:  "Nexplanon 03/2022       COMPREHENSIVE GYN HISTORY:  PAP History: Denies abnormal Paps.  Infection History: Denies STDs. Denies PID.  Benign History: Denies uterine fibroids. Denies ovarian cysts. Denies endometriosis. Denies other conditions.  Cancer History: Denies cervical cancer. Denies uterine cancer or hyperplasia. Denies ovarian cancer. Denies vulvar cancer or pre-cancer. Denies vaginal cancer or pre-cancer. Denies breast cancer. Denies colon cancer.  Sexual Activity History: Reports currently being sexually active  Menstrual History: Monthly. Mod then light flow.   Dysmenorrhea History: Reports mild dysmenorrhea.     ROS:  GENERAL: No weight changes. No swelling. No fatigue. No fever.  CARDIOVASCULAR: No chest pain. No shortness of breath. No leg cramps.   NEUROLOGICAL: No headaches. No vision changes.  BREASTS: No pain. No lumps. No discharge.  ABDOMEN: No pain. No nausea. No vomiting. No diarrhea. No constipation.  REPRODUCTIVE: No abnormal bleeding.   VULVA: No pain. No lesions. No itching.  VAGINA: No relaxation. No itching. No odor. No discharge. No lesions.  URINARY: No incontinence. No nocturia. No frequency. No dysuria.    /88   Ht 5' 2" (1.575 m)   Wt 78.4 kg (172 lb 13.5 oz)   LMP  (LMP Unknown)   BMI 31.61 kg/m²     PE:  APPEARANCE: Well nourished, well developed, in no acute distress.  AFFECT: WNL, alert and oriented x 3.  SKIN: No acne or hirsutism.  NECK: Neck symmetric, without masses or thyromegaly.  NODES: No inguinal, cervical, axillary or femoral lymph node enlargement.  CHEST: Good respiratory effort.   ABDOMEN: Soft. No tenderness or masses. No hepatosplenomegaly. No hernias.  BREASTS: Symmetrical, no skin changes, visible lesions, palpable masses or nipple discharge bilaterally.  PELVIC: External female genitalia without lesions.  Female hair distribution. Adequate perineal body, Normal urethral meatus. Vagina moist and well rugated without lesions or discharge.  No significant " cystocele or rectocele present. Cervix pink without lesions, discharge or tenderness. Uterus is 4-6 week size, regular, mobile and nontender. Adnexa without masses or tenderness.  EXTREMITIES: No edema    DIAGNOSIS:  1. Encounter for gynecological examination without abnormal finding    2. Encounter for contraceptive management, unspecified type      PLAN:    COUNSELING:  The patient was counseled today on:  -A.C.S. Pap and pelvic exam guidelines (pap every 3 years), recomendations for yearly mammogram;  -to follow up with her PCP for other health maintenance.    FOLLOW-UP with me in 3 months\  Plan Nexplanon Removal patient is scheduled to see NP Held for consultation

## 2025-01-23 ENCOUNTER — OFFICE VISIT (OUTPATIENT)
Dept: OBSTETRICS AND GYNECOLOGY | Facility: CLINIC | Age: 35
End: 2025-01-23
Payer: COMMERCIAL

## 2025-01-23 ENCOUNTER — TELEPHONE (OUTPATIENT)
Dept: OBSTETRICS AND GYNECOLOGY | Facility: CLINIC | Age: 35
End: 2025-01-23
Payer: COMMERCIAL

## 2025-01-23 DIAGNOSIS — Z97.5 BREAKTHROUGH BLEEDING ON NEXPLANON: Primary | ICD-10-CM

## 2025-01-23 DIAGNOSIS — N92.1 BREAKTHROUGH BLEEDING ON NEXPLANON: Primary | ICD-10-CM

## 2025-01-23 PROCEDURE — 98005 SYNCH AUDIO-VIDEO EST LOW 20: CPT | Mod: 95,,, | Performed by: OBSTETRICS & GYNECOLOGY

## 2025-01-24 NOTE — PROGRESS NOTES
Audio Only Telehealth Visit     The patient location is: home  The chief complaint leading to consultation is: discuss bleeding with nexplanon in place  Visit type: Virtual visit with audio only (telephone)  Total time spent with patient: 21 min     The reason for the audio only service rather than synchronous audio and video virtual visit was related to technical difficulties or patient preference/necessity.     Each patient to whom I provide medical services by telemedicine is:  (1) informed of the relationship between the physician and patient and the respective role of any other health care provider with respect to management of the patient; and (2) notified that they may decline to receive medical services by telemedicine and may withdraw from such care at any time. Patient verbally consented to receive this service via voice-only telephone call.       HPI:  Pt is a 33 y/o with Nexplanon in place.  Nexplanon placed 3/2022.  Bleeding was okay for the 1st year.  Did not have any issues.  However, last year, started to have irregular bleeding where she would have 3 weeks of bleeding every quarter.  Pt was given course of estrace in November but reports that she continued to have heavy, irregular bleeding.  Pt is not currently bleeding today.  Pt was seen by Dr. Jones last month and was given options of Nexplanon replacement vs other contraception options.  Has tried OCPs in the past but had compliance issues with the daily pill.  Pt was offered Phexxi but does not feel like that would work for her.  Does not want an IUD or depo provera.  Pt notes that after the discsusion with her , he plans to get a vasectomy.  Pt is unsure what her periods are like off of contraception because it has been a while.  Does have some concerns about her flow without the nexplanon but would like to give her body some time off of the medication to see how her periods are doing.     Assessment and plan:     Diagnoses and all  orders for this visit:    Breakthrough bleeding on Nexplanon  - Discussed that this could be due to her nearing the end of the nexplanon.  Due for replacement in March due to expiration  - Pt has been having increased bleeding.  Estrace supplementation was not effective  - Revisited all of the contraception options with the patient  - Pt would like to have the nexplanon replaced.  Will message primary GYN and have patient scheduled for replacement  - All questions answered.       No orders of the defined types were placed in this encounter.      No follow-ups on file.         This service was not originating from a related E/M service provided within the previous 7 days nor will  to an E/M service or procedure within the next 24 hours or my soonest available appointment.  Prevailing standard of care was able to be met in this audio-only visit.        Nancy Drake MD  Obstetrics & Gynecology

## 2025-01-25 LAB
FINAL PATHOLOGIC DIAGNOSIS: NORMAL
Lab: NORMAL

## 2025-01-31 ENCOUNTER — PATIENT MESSAGE (OUTPATIENT)
Dept: OBSTETRICS AND GYNECOLOGY | Facility: CLINIC | Age: 35
End: 2025-01-31
Payer: COMMERCIAL

## 2025-02-05 LAB
HPV HR 12 DNA SPEC QL NAA+PROBE: NEGATIVE
HPV16 AG SPEC QL: NEGATIVE
HPV18 DNA SPEC QL NAA+PROBE: NEGATIVE

## 2025-04-07 ENCOUNTER — OFFICE VISIT (OUTPATIENT)
Dept: OBSTETRICS AND GYNECOLOGY | Facility: CLINIC | Age: 35
End: 2025-04-07
Payer: COMMERCIAL

## 2025-04-07 VITALS — WEIGHT: 174.19 LBS | BODY MASS INDEX: 32.05 KG/M2 | HEIGHT: 62 IN

## 2025-04-07 DIAGNOSIS — Z30.9 ENCOUNTER FOR CONTRACEPTIVE MANAGEMENT, UNSPECIFIED TYPE: Primary | ICD-10-CM

## 2025-04-07 DIAGNOSIS — F33.1 MAJOR DEPRESSIVE DISORDER, RECURRENT EPISODE, MODERATE: ICD-10-CM

## 2025-04-07 DIAGNOSIS — F31.70 BIPOLAR DISORDER IN REMISSION: ICD-10-CM

## 2025-04-07 DIAGNOSIS — F41.1 GENERALIZED ANXIETY DISORDER: Chronic | ICD-10-CM

## 2025-04-07 PROCEDURE — 99999 PR PBB SHADOW E&M-EST. PATIENT-LVL III: CPT | Mod: PBBFAC,,, | Performed by: OBSTETRICS & GYNECOLOGY

## 2025-04-07 RX ORDER — LACTIC ACID, L-, CITRIC ACID MONOHYDRATE, AND POTASSIUM BITARTRATE 90; 50; 20 MG/5G; MG/5G; MG/5G
1 GEL VAGINAL
Qty: 120 G | Refills: 3 | Status: SHIPPED | OUTPATIENT
Start: 2025-04-07

## 2025-04-07 NOTE — PROGRESS NOTES
"HISTORY OF PRESENT ILLNESS:    Corrine Daily is a 34 y.o. female  Patient's last menstrual period was 2025 (approximate). presents today to discuss contraceptive options.     Past Medical History:   Diagnosis Date    Anxiety     Depression        Past Surgical History:   Procedure Laterality Date    ADENOIDECTOMY  aroung [id number]     SECTION  2020    twins, PTD at 35 wga     MEDICATIONS AND ALLERGIES:    Current Medications[1]    Review of patient's allergies indicates:   Allergen Reactions    Penicillins      unknown    Prozac [fluoxetine] Other (See Comments)     Suicide thoughts       COMPREHENSIVE GYN HISTORY:  PAP History: Denies abnormal Paps.  Infection History: Denies STDs. Denies PID.  Benign History: Denies uterine fibroids. Denies ovarian cysts. Denies endometriosis. Denies other conditions.  Cancer History: Denies cervical cancer. Denies uterine cancer or hyperplasia. Denies ovarian cancer. Denies vulvar cancer or pre-cancer. Denies vaginal cancer or pre-cancer. Denies breast cancer. Denies colon cancer.  Sexual Activity History: Reports currently being sexually active  Menstrual History: Every 28 days, flows for 4 days. Light flow.  Dysmenorrhea History: Denies dysmenorrhea.    ROS:  GENERAL: No fever or chills.  BREASTS: No pain. No lumps. No discharge.  ABDOMEN: No pain. No nausea. No vomiting. No diarrhea. No constipation.  REPRODUCTIVE: No abnormal bleeding.   VULVA: No pain. No lesions. No itching.  VAGINA: No relaxation. No itching. No odor. No discharge. No lesions.  URINARY: No incontinence. No nocturia. No frequency. No dysuria.    Ht 5' 2" (1.575 m)   Wt 79 kg (174 lb 2.6 oz)   LMP 2025 (Approximate)   BMI 31.85 kg/m²     PE:  APPEARANCE: Well nourished, well developed, in no acute distress.  AFFECT: WNL, alert and oriented x 3.  Deferred    1. Encounter for contraceptive management, unspecified type      PLAN:    Orders Placed This Encounter    lactic " acid-citric-potassium (PHEXXI) 1.8-1-0.4 % Gel     COUNSELING:  The patient was counseled today on:     addressing problems separate from annual exam.  This includes face to face time and non-face to face time preparing to see the patient (eg, review of tests), Obtaining and/or reviewing separately obtained history, Documenting clinical information in the electronic or other health record, Independently interpreting resultsand communicating results to the patient/family/caregiver, or Care coordination.     FOLLOW-UP with me for WWE       [1]   Current Outpatient Medications:     ALPRAZolam (XANAX) 0.5 MG tablet, , Disp: , Rfl:     ARIPiprazole 10 mg tbsp, , Disp: , Rfl:     escitalopram oxalate (LEXAPRO) 10 MG tablet, TK 1 T PO QD, Disp: , Rfl: 0    estradioL (ESTRACE) 2 MG tablet, Take 1 tablet (2 mg total) by mouth once daily. for 10 days, Disp: 10 tablet, Rfl: 1    lactic acid-citric-potassium (PHEXXI) 1.8-1-0.4 % Gel, Place 1 applicator vaginally as needed (Right before or up to one hour before sex)., Disp: 120 g, Rfl: 3

## 2025-04-15 ENCOUNTER — PATIENT MESSAGE (OUTPATIENT)
Dept: OBSTETRICS AND GYNECOLOGY | Facility: CLINIC | Age: 35
End: 2025-04-15
Payer: COMMERCIAL

## 2025-04-16 ENCOUNTER — TELEPHONE (OUTPATIENT)
Dept: OBSTETRICS AND GYNECOLOGY | Facility: CLINIC | Age: 35
End: 2025-04-16
Payer: COMMERCIAL

## 2025-05-21 ENCOUNTER — OFFICE VISIT (OUTPATIENT)
Dept: OTOLARYNGOLOGY | Facility: CLINIC | Age: 35
End: 2025-05-21
Payer: COMMERCIAL

## 2025-05-21 VITALS
HEART RATE: 64 BPM | WEIGHT: 169.88 LBS | DIASTOLIC BLOOD PRESSURE: 80 MMHG | SYSTOLIC BLOOD PRESSURE: 130 MMHG | BODY MASS INDEX: 31.07 KG/M2

## 2025-05-21 DIAGNOSIS — H61.21 IMPACTED CERUMEN OF RIGHT EAR: Primary | ICD-10-CM

## 2025-05-21 NOTE — PROGRESS NOTES
OTOLARYNGOLOGY CLINIC NOTE  Date:  2025     Chief complaint:  Chief Complaint   Patient presents with    Consult     Right ear clogged for 4wks on and off     History of Present Illness  Corrine Daily is a 34 y.o. female  presenting today for a new evaluation and treatment of right ear clogged.  Pt reports having difficulty hearing out of her right ear.  Pt reports it has been feeling clogged for the past 4 weeks.  Pt denies any otalgia, otorrhea, tinnitus or vertigo.      Past Medical History  Past Medical History:   Diagnosis Date    Anxiety     Depression       Past Surgical History  Past Surgical History:   Procedure Laterality Date    ADENOIDECTOMY  aroung [id number]     SECTION  2020    twins, PTD at 35 wga      Medications  Medications Ordered Prior to Encounter[1]    Review of Systems  Review of Systems   Constitutional: Negative.    HENT: Negative.     Eyes: Negative.    Respiratory: Negative.     Cardiovascular: Negative.    Gastrointestinal: Negative.    Genitourinary: Negative.    Musculoskeletal:  Positive for back pain.   Skin: Negative.    Neurological: Negative.    Psychiatric/Behavioral:  Positive for depression. The patient is nervous/anxious.       Social History   reports that she has been smoking cigarettes and vaping with nicotine. She has been exposed to tobacco smoke. She has never used smokeless tobacco. She reports current alcohol use. She reports current drug use. Drug: Marijuana.     Family History  Family History   Adopted: Yes   Problem Relation Name Age of Onset    Hypertension Neg Hx      Breast cancer Neg Hx      Colon cancer Neg Hx      Ovarian cancer Neg Hx        Physical Exam   Vitals:    25 1105   BP: 130/80   Pulse: 64    Body mass index is 31.07 kg/m².  Weight: 77 kg (169 lb 13.8 oz)       GENERAL: no acute distress.  HEAD: normocephalic.   EYES: lids and lashes normal. No scleral icterus  EARS: external ear without lesion, normal pinna shape and  position.  Right external auditory canal with cerumen impaction   NOSE: external nose without significant bony abnormality  ORAL CAVITY/OROPHARYNX: tongue midline and mobile. Symmetric palate rise.   NECK: trachea midline.   RESPIRATORY: no stridor, no stertor. Voice normal. Respirations nonlabored.  NEURO: alert, responds to questions appropriately.   PSYCH:mood appropriate    Procedure Note   Procedure performed: microscopic examination of ears with cerumen disimpaction    Indication for procedure: unilateral cerumen impaction     Description of procedure:  After verbal consent was obtained, the patient was positioned in semi recumbent position and speculum was placed in the right   ear and microscope brought into the field.  The microscope was positioned and magnification adjusted for appropriate visualization. Otologic instruments including various size otologic suctions and curette were used to remove the cerumen from the right external auditory canals under visualization with the operating microscope. After cleaning, visualization was again performed and at various levels of higher magnification to optimize views of the ear canal, tympanic membrane, ossicles and middle ear space. Findings as indicated below. All portions of the procedure and examination by otomicroscopy were tolerated well without complication.     Findings:  Right complete cerumen impaction removed entirely revealing normal external auditory canal; tympanic membrane without bulging, retraction, or perforation; no evidence of middle ear fluid or effusion.     Imaging:  The patient does not have any pertinent and/or recent imaging of the head and neck.     Labs:  CBC  Recent Labs   Lab 01/23/24  1117   WBC 6.11   Hemoglobin 12.8   Hematocrit 39.6   MCV 86   Platelets 279     BMP  Recent Labs   Lab 01/23/24  1117   Glucose 88   Sodium 139   Potassium 4.4   Chloride 107   CO2 28   BUN 11   Creatinine 0.9   Calcium 9.7     Assessment  1. Impacted  cerumen of right ear     Plan:  Cerumen Impaction:  We discussed preventative measures and treatment options. Q-tips must be avoided, instead the ears can be cleaned with OTC ear rinses (or Debrox).   Discussed plan of care with patient in detail and all questions answered. Patient reported understanding of plan of care.        [1]   Current Outpatient Medications on File Prior to Visit   Medication Sig Dispense Refill    ALPRAZolam (XANAX) 0.5 MG tablet       ARIPiprazole 10 mg tbsp       escitalopram oxalate (LEXAPRO) 10 MG tablet TK 1 T PO QD  0    lactic acid-citric-potassium (PHEXXI) 1.8-1-0.4 % Gel Place 1 applicator vaginally as needed (Right before or up to one hour before sex). 120 g 3    estradioL (ESTRACE) 2 MG tablet Take 1 tablet (2 mg total) by mouth once daily. for 10 days 10 tablet 1     No current facility-administered medications on file prior to visit.

## 2025-05-27 ENCOUNTER — OFFICE VISIT (OUTPATIENT)
Facility: CLINIC | Age: 35
End: 2025-05-27
Payer: COMMERCIAL

## 2025-05-27 ENCOUNTER — OFFICE VISIT (OUTPATIENT)
Dept: URGENT CARE | Facility: CLINIC | Age: 35
End: 2025-05-27
Payer: COMMERCIAL

## 2025-05-27 ENCOUNTER — NURSE TRIAGE (OUTPATIENT)
Dept: ADMINISTRATIVE | Facility: CLINIC | Age: 35
End: 2025-05-27
Payer: COMMERCIAL

## 2025-05-27 VITALS
BODY MASS INDEX: 31.36 KG/M2 | WEIGHT: 170.44 LBS | HEIGHT: 62 IN | OXYGEN SATURATION: 99 % | SYSTOLIC BLOOD PRESSURE: 91 MMHG | TEMPERATURE: 99 F | RESPIRATION RATE: 18 BRPM | DIASTOLIC BLOOD PRESSURE: 60 MMHG | HEART RATE: 75 BPM

## 2025-05-27 VITALS
HEIGHT: 62 IN | OXYGEN SATURATION: 100 % | BODY MASS INDEX: 31.38 KG/M2 | DIASTOLIC BLOOD PRESSURE: 80 MMHG | HEART RATE: 97 BPM | RESPIRATION RATE: 18 BRPM | SYSTOLIC BLOOD PRESSURE: 110 MMHG | WEIGHT: 170.5 LBS | TEMPERATURE: 98 F

## 2025-05-27 DIAGNOSIS — Z11.1 SCREENING-PULMONARY TB: ICD-10-CM

## 2025-05-27 DIAGNOSIS — Z01.419 ENCOUNTER FOR GYNECOLOGICAL EXAMINATION WITHOUT ABNORMAL FINDING: ICD-10-CM

## 2025-05-27 DIAGNOSIS — Z02.0 SCHOOL PHYSICAL EXAM: ICD-10-CM

## 2025-05-27 DIAGNOSIS — M25.511 ACUTE PAIN OF RIGHT SHOULDER: ICD-10-CM

## 2025-05-27 DIAGNOSIS — N89.8 VAGINAL ITCHING: Primary | ICD-10-CM

## 2025-05-27 DIAGNOSIS — Z11.3 SCREEN FOR STD (SEXUALLY TRANSMITTED DISEASE): ICD-10-CM

## 2025-05-27 DIAGNOSIS — F41.1 GENERALIZED ANXIETY DISORDER: Chronic | ICD-10-CM

## 2025-05-27 DIAGNOSIS — Z76.89 ENCOUNTER TO ESTABLISH CARE: Primary | ICD-10-CM

## 2025-05-27 DIAGNOSIS — N76.5 VAGINAL ULCERATION: ICD-10-CM

## 2025-05-27 PROCEDURE — 87389 HIV-1 AG W/HIV-1&-2 AB AG IA: CPT | Performed by: FAMILY MEDICINE

## 2025-05-27 PROCEDURE — 99999 PR PBB SHADOW E&M-EST. PATIENT-LVL IV: CPT | Mod: PBBFAC,,,

## 2025-05-27 PROCEDURE — 87591 N.GONORRHOEAE DNA AMP PROB: CPT | Performed by: FAMILY MEDICINE

## 2025-05-27 PROCEDURE — 81515 NFCT DS BV&VAGINITIS DNA ALG: CPT | Performed by: FAMILY MEDICINE

## 2025-05-27 PROCEDURE — 99214 OFFICE O/P EST MOD 30 MIN: CPT | Mod: S$GLB,,, | Performed by: FAMILY MEDICINE

## 2025-05-27 PROCEDURE — 86593 SYPHILIS TEST NON-TREP QUANT: CPT | Performed by: FAMILY MEDICINE

## 2025-05-27 PROCEDURE — 80074 ACUTE HEPATITIS PANEL: CPT | Performed by: FAMILY MEDICINE

## 2025-05-27 RX ORDER — MUPIROCIN 20 MG/G
OINTMENT TOPICAL 3 TIMES DAILY
Qty: 22 G | Refills: 0 | Status: SHIPPED | OUTPATIENT
Start: 2025-05-27

## 2025-05-27 RX ORDER — IBUPROFEN 800 MG/1
800 TABLET, FILM COATED ORAL EVERY 8 HOURS PRN
Qty: 30 TABLET | Refills: 1 | Status: SHIPPED | OUTPATIENT
Start: 2025-05-27

## 2025-05-27 NOTE — PROGRESS NOTES
"Subjective:      Patient ID: Corrine Daily is a 34 y.o. female.    Vitals:  height is 5' 2" (1.575 m) and weight is 77.3 kg (170 lb 6.7 oz). Her oral temperature is 98.6 °F (37 °C). Her blood pressure is 91/60 and her pulse is 75. Her respiration is 18 and oxygen saturation is 99%.     Chief Complaint: Vaginal Itching    35 y/o female complains of vaginal irration that 3 days ago after having sex.    Vaginal Itching  This is a new problem. The current episode started in the past 7 days. The problem has been unchanged. The patient is experiencing no pain. The problem affects the left side. She is not pregnant. Nothing aggravates the symptoms. She has tried nothing for the symptoms. The treatment provided no relief. She is sexually active. It is unknown whether or not her partner has an STD. She uses progestin injections for contraception. Her menstrual history has been irregular. Her past medical history is significant for a  section, miscarriage and a terminated pregnancy.     ROS   Objective:     Physical Exam   Constitutional: She is oriented to person, place, and time.   HENT:   Head: Normocephalic.   Ears:   Right Ear: External ear normal.   Left Ear: External ear normal.   Nose: Nose normal.   Mouth/Throat: Mucous membranes are moist.   Eyes: Conjunctivae are normal.   Cardiovascular: Normal rate.   Pulmonary/Chest: Effort normal.   Genitourinary:       There is tenderness and lesion on the left labia. There is no rash on the left labia.         Comments: Tear at the inferior inner labial lip, several ulceration and what look to be inflamed follicles. White creamy discharge seen at the vaginal introitus     Musculoskeletal: Normal range of motion.         General: Normal range of motion.   Neurological: She is alert and oriented to person, place, and time.   Skin: Skin is dry.   Psychiatric: Her behavior is normal. chaperone present (trish)         Assessment:     1. Vaginal itching    2. Vaginal " ulceration    3. Screen for STD (sexually transmitted disease)        Plan:       Vaginal itching  -     Vaginosis Screen by DNA Probe  -     C. trachomatis/N. gonorrhoeae by AMP DNA [facility]; Vagina  -     HIV 1/2 Ag/Ab (4th Gen); Future; Expected date: 05/27/2025  -     Treponema Pallidium Antibodies IgG, IgM  -     HEPATITIS PANEL, ACUTE; Future; Expected date: 05/27/2025  -     mupirocin (BACTROBAN) 2 % ointment; Apply topically 3 (three) times daily.  Dispense: 22 g; Refill: 0    Vaginal ulceration  -     HIV 1/2 Ag/Ab (4th Gen); Future; Expected date: 05/27/2025  -     Treponema Pallidium Antibodies IgG, IgM  -     HEPATITIS PANEL, ACUTE; Future; Expected date: 05/27/2025  -     mupirocin (BACTROBAN) 2 % ointment; Apply topically 3 (three) times daily.  Dispense: 22 g; Refill: 0    Screen for STD (sexually transmitted disease)  -     HIV 1/2 Ag/Ab (4th Gen); Future; Expected date: 05/27/2025  -     Treponema Pallidium Antibodies IgG, IgM  -     HEPATITIS PANEL, ACUTE; Future; Expected date: 05/27/2025      Follow portal for pending results.

## 2025-05-27 NOTE — PROGRESS NOTES
SUBJECTIVE     History of Present Illness    CHIEF COMPLAINT:  Ms. Daily presents today to establish care and obtain titers for school requirements.    MEDICAL HISTORY:  She has a penicillin allergy since childhood with symptoms including nausea, vomiting, sweating, and anaphylaxis. She also reports skin flare-ups upon contact with cardboard boxes.    MUSCULOSKELETAL:  She reports right shoulder pain persisting for 1-2 years, described as intermittent. The pain improves with proper sleeping position, particularly when supine, and with conscious attention to movement and rest. She also reports wrist pain. She denies any history of accidents or injuries related to these symptoms.    PSYCHIATRIC:  She sees psychiatrist Rose Mary Polk once or twice monthly and takes Adderall and Xanax under her management. She recently discontinued therapy.    SOCIAL HISTORY:  She is pursuing an optometry degree and reports sleeping 6-8 hours per night.      ROS:  General: -fever, -chills, -fatigue, -weight gain, -weight loss  Eyes: -vision changes, -redness, -discharge  ENT: -ear pain, -nasal congestion, -sore throat  Cardiovascular: -chest pain, -palpitations, -lower extremity edema  Respiratory: -cough, -shortness of breath  Gastrointestinal: -abdominal pain, -nausea, -vomiting, -diarrhea, -constipation, -blood in stool  Genitourinary: -dysuria, -hematuria, -frequency  Musculoskeletal: +joint pain, +muscle pain  Skin: -rash, -lesion, +skin redness  Neurological: -headache, -dizziness, -numbness, -tingling  Psychiatric: -anxiety, -depression, -sleep difficulty          PAST MEDICAL HISTORY:  Past Medical History:   Diagnosis Date    Anxiety     Depression        PAST SURGICAL HISTORY:  Past Surgical History:   Procedure Laterality Date    ADENOIDECTOMY  aroung [id number]     SECTION  2020    twins, PTD at 35 wga       SOCIAL HISTORY:  Social History[1]    FAMILY HISTORY:  Family History   Adopted: Yes   Problem Relation  "Name Age of Onset    Hypertension Neg Hx      Breast cancer Neg Hx      Colon cancer Neg Hx      Ovarian cancer Neg Hx         ALLERGIES AND MEDICATIONS: updated and reviewed.  Review of patient's allergies indicates:   Allergen Reactions    Penicillins      unknown    Prozac [fluoxetine] Other (See Comments)     Suicide thoughts     Current Medications[2]        OBJECTIVE     Physical Exam  Vitals:    05/27/25 1110   BP: 110/80   Pulse: 97   Resp: 18   Temp: 98.2 °F (36.8 °C)    Body mass index is 31.19 kg/m².  Weight: 77.3 kg (170 lb 8.4 oz)   Height: 5' 2" (157.5 cm)     Physical Exam    General: No acute distress. Well-developed. Well-nourished.  Eyes: EOMI. Sclerae anicteric.  HENT: Normocephalic. Atraumatic. Nares patent. Moist oral mucosa.  Ears: Bilateral TMs clear. Bilateral EACs clear.  Cardiovascular: Regular rate. Regular rhythm. No murmurs. No rubs. No gallops. Normal S1, S2.  Respiratory: Normal respiratory effort. Clear to auscultation bilaterally. No rales. No rhonchi. No wheezing.  Abdomen: Soft. Non-tender. Non-distended. Normoactive bowel sounds.  Musculoskeletal: No  obvious deformity. Tenderness in right shoulder. Tightness in shoulder muscles.  Extremities: No lower extremity edema.  Neurological: Alert & oriented x3. No slurred speech. Normal gait.  Psychiatric: Normal mood. Normal affect. Good insight. Good judgment, denies SI/HI.  Skin: Warm. Dry. No rash.            Health Maintenance         Date Due Completion Date    Lipid Panel Never done ---    Pneumococcal Vaccines (Age 0-49) (1 of 2 - PCV) Never done ---    COVID-19 Vaccine (2 - 2024-25 season) 09/01/2024 5/1/2021    Influenza Vaccine (Season Ended) 09/01/2025 11/11/2022    TETANUS VACCINE 08/30/2026 8/30/2016    Cervical Cancer Screening 01/15/2030 1/15/2025    RSV Vaccine (Age 60+ and Pregnant patients) (1 - 1-dose 75+ series) 11/18/2067 ---              ASSESSMENT     34 y.o. female with     1. Encounter to establish care    2. " Acute pain of right shoulder    3. School physical exam    4. Screening-pulmonary TB    5. Generalized anxiety disorder        PLAN:     1. Encounter to establish care  - Discussed age and gender appropriate screenings at this visit and encouraged a healthy diet low in simple carbohydrates, and increased physical activity.  Counseled on medically appropriate vaccines based on age and current health condition.  Screening test reviewed and discussed with patient.    - Hemoglobin A1C; Future  - Lipid Panel; Future  - TSH; Future  - Comprehensive Metabolic Panel; Future  - CBC Auto Differential; Future    2. Acute pain of right shoulder  Pt encouraged to apply ice packs 2-3 times daily at 10 minute intervals x 72 hours, then okay to change to heating compress with care not to burn her self; she  voiced understanding    - ibuprofen (ADVIL,MOTRIN) 800 MG tablet; Take 1 tablet (800 mg total) by mouth every 8 (eight) hours as needed for Pain.  Dispense: 30 tablet; Refill: 1    3. School physical exam  Titers and TB screening ordered  - Rubella antibody, IgG; Future  - Rubeola antibody IgG; Future  - Mumps, IgG Screen; Future  - Hepatitis B Surface Antibody, Qual/Quant; Future  - Varicella zoster antibody, IgG; Future  - QUANTIFERON GOLD TB; Future    4. Screening-pulmonary TB    - QUANTIFERON GOLD TB; Future    5. Generalized anxiety disorder  - Stable; no acute issues  - The current medical regimen is effective;  continue present plan and medications.   -f/u psychiatry as scheduled    Assessment & Plan    Joint pain, particularly in right shoulder, likely due to stress and tension rather than injury.  Recommend conservative treatment approach for shoulder pain, including ice therapy followed by heat, NSAIDs, and stretching exercises.    PENICILLIN ALLERGY:  - Documented the patient's history of penicillin allergy from childhood, which caused nausea, vomiting, sweating, syncope, and anaphylaxis.    RIGHT SHOULDER PAIN:  -  Ms. Daily reports right shoulder pain on and off for 1-2 years, which worsens with certain movements and improves with conscious positioning.  - Examination revealed tenderness without injury, likely due to stress and tension.  - Instructed patient to perform shoulder stretches and neck exercises as provided in information packet.  - Advised ice therapy to affected area 3 times daily for 10 minutes over 3 consecutive days, then switch to heat therapy.  - Prescribed ibuprofen 800 mg every 8 hours as needed for pain and inflammation.    MUSCLE SPASM:  - Examination revealed muscle tightness and tension, likely stress-related.  - Instructed patient to perform the same shoulder stretches and neck exercises provided for shoulder pain management.    WRIST PAIN:  - Ms. Daily reports wrist pain that worsens with certain movements and improves with conscious positioning.  - Recommend stretching exercises and to use the same ice therapy and ibuprofen regimen prescribed for shoulder pain.    ANXIETY DISORDER:  - Noted that patient's anxiety issues contribute to muscle tension and stress.  - Advised finding relaxation methods, specifically recommending yoga (YouTube videos suggested as a resource) and stretching exercises.    ATTENTION-DEFICIT HYPERACTIVITY DISORDER:  - Ms. Daily is taking Adderall and sees psychiatrist Rose Mary Polk 1-2 times monthly for ADHD management.    GENERAL HEALTH RECOMMENDATIONS:  - Explained importance of balanced diet consisting of lean meats, complex carbohydrates from fruits, vegetables, and whole grains, while limiting simple sugars.  - Recommend regular exercise for 30 minutes, 4-5 days per week.  - Discussed safety measures including avoiding drinking and driving, wearing seatbelts, and using condoms to prevent STDs.    LABS:  - Ordered comprehensive lab work for new patient, including diabetes screening, cholesterol, thyroid, liver, kidney, and blood counts.  - Ordered Quantiferon Gold test for  TB screening as required for patient's school.  - Ordered titer panel for MMR, Hepatitis B, and Varicella.    FOLLOW-UP:  - Follow up in 4 weeks.  - Contact office for virtual appointment to discuss lab results or whether in-person follow-up is necessary.           Follow up in about 4 weeks (around 6/24/2025).    This note was generated with the assistance of ambient listening technology. Verbal consent was obtained by the patient and accompanying visitor(s) for the recording of patient appointment to facilitate this note. I attest to having reviewed and edited the generated note for accuracy, though some syntax or spelling errors may persist. Please contact the author of this note for any clarification.      Sean GRIFFINP-C  05/27/2025 1:15 PM             [1]   Social History  Socioeconomic History    Marital status:    Tobacco Use    Smoking status: Some Days     Current packs/day: 0.25     Types: Cigarettes, Vaping with nicotine     Passive exposure: Current    Smokeless tobacco: Never   Substance and Sexual Activity    Alcohol use: Yes     Comment: socially    Drug use: Yes     Types: Marijuana    Sexual activity: Yes     Partners: Male     Birth control/protection: Implant     Comment: Nexplanon 03/2022     Social Drivers of Health     Financial Resource Strain: Low Risk  (5/21/2025)    Overall Financial Resource Strain (CARDIA)     Difficulty of Paying Living Expenses: Not very hard   Food Insecurity: No Food Insecurity (5/21/2025)    Hunger Vital Sign     Worried About Running Out of Food in the Last Year: Never true     Ran Out of Food in the Last Year: Never true   Transportation Needs: No Transportation Needs (5/21/2025)    PRAPARE - Transportation     Lack of Transportation (Medical): No     Lack of Transportation (Non-Medical): No   Physical Activity: Inactive (5/21/2025)    Exercise Vital Sign     Days of Exercise per Week: 0 days     Minutes of Exercise per Session: 0 min   Stress:  No Stress Concern Present (5/21/2025)    [place] [employer] [place] [employer] - Occupational Stress Questionnaire     Feeling of Stress : Only a little   Housing Stability: Low Risk  (5/21/2025)    Housing Stability Vital Sign     Unable to Pay for Housing in the Last Year: No     Number of Times Moved in the Last Year: 0     Homeless in the Last Year: No   [2]   Current Outpatient Medications   Medication Sig Dispense Refill    ALPRAZolam (XANAX) 0.5 MG tablet       ARIPiprazole 10 mg tbsp       escitalopram oxalate (LEXAPRO) 10 MG tablet TK 1 T PO QD  0    ibuprofen (ADVIL,MOTRIN) 800 MG tablet Take 1 tablet (800 mg total) by mouth every 8 (eight) hours as needed for Pain. 30 tablet 1    lactic acid-citric-potassium (PHEXXI) 1.8-1-0.4 % Gel Place 1 applicator vaginally as needed (Right before or up to one hour before sex). (Patient not taking: Reported on 5/27/2025) 120 g 3     No current facility-administered medications for this visit.

## 2025-05-27 NOTE — TELEPHONE ENCOUNTER
Patient states onset of vaginal irritation after sexual intercourse three (3) days ago. Patient states her symptoms initially felt like a cut on her vulva that has progressed to redness and irritation. Patient denies vaginal discharge fever and pain.     Patient advised to See PCP today for evaluation/treatment. Patient scheduled herself an appointment for today, 5/27/25, via her My Chart portal. Patient also advised to contact the [facility] for any worsening symptoms. Patient states understanding of care advice.     Reason for Disposition   Genital area looks infected (e.g., draining sore, spreading redness)    Additional Information   Negative: Sounds like a life-threatening emergency to the triager   Negative: Followed a genital area injury (e.g., vagina, vulva)   Negative: Vaginal bleeding is main symptom   Negative: Vaginal discharge is main symptom   Negative: Pain or burning with passing urine (urination) is main symptom   Negative: Menstrual cramps is main symptom   Negative: Abdomen pain is main symptom   Negative: Pubic lice suspected   Negative: Itching or rash of female genital area (e.g., labia, vagina, vulva)   Negative: Pregnant and labor suspected   Negative: Patient sounds very sick or weak to the triager   Negative: SEVERE vaginal pain and not improved 2 hours after pain medicine   Negative: Genital area looks infected (e.g., draining sore, spreading redness) and fever   Negative: Something is hanging out of the vagina and can't easily be pushed back inside   Negative: MILD-MODERATE vaginal pain and present > 24 hours (Exception: Chronic pain.)    Protocols used: Vaginal Symptoms-A-[place]

## 2025-05-28 LAB
HAV IGM SERPL QL IA: NORMAL
HBV CORE IGM SERPL QL IA: NORMAL
HBV SURFACE AG SERPL QL IA: NORMAL
HCV AB SERPL QL IA: NORMAL
HIV 1+2 AB+HIV1 P24 AG SERPL QL IA: NORMAL
T PALLIDUM IGG+IGM SER QL: NORMAL

## 2025-05-29 NOTE — TELEPHONE ENCOUNTER
Spoke with pt via phone reports she was seen ay [facility] [address], States is improving. Labs still pending,

## 2025-05-30 LAB
BACTERIAL VAGINOSIS DNA (OHS): NOT DETECTED
C TRACH DNA SPEC QL NAA+PROBE: NOT DETECTED
CANDIDA GLABRATA/KRUSEI DNA (OHS): NOT DETECTED
CANDIDA SPECIES DNA (OHS): DETECTED
CTGC SOURCE (OHS) ORD-325: NORMAL
N GONORRHOEA DNA UR QL NAA+PROBE: NOT DETECTED
TRICHOMONAS VAGINALIS DNA (OHS): NOT DETECTED

## 2025-05-31 ENCOUNTER — TELEPHONE (OUTPATIENT)
Dept: URGENT CARE | Facility: CLINIC | Age: 35
End: 2025-05-31
Payer: COMMERCIAL

## 2025-05-31 DIAGNOSIS — B37.31 VAGINAL YEAST INFECTION: Primary | ICD-10-CM

## 2025-05-31 RX ORDER — FLUCONAZOLE 150 MG/1
150 TABLET ORAL
Qty: 2 TABLET | Refills: 0 | Status: SHIPPED | OUTPATIENT
Start: 2025-05-31 | End: 2025-06-06

## 2025-06-10 ENCOUNTER — PATIENT MESSAGE (OUTPATIENT)
Facility: CLINIC | Age: 35
End: 2025-06-10
Payer: COMMERCIAL

## 2025-06-17 ENCOUNTER — PATIENT MESSAGE (OUTPATIENT)
Facility: CLINIC | Age: 35
End: 2025-06-17
Payer: COMMERCIAL

## 2025-06-30 ENCOUNTER — OFFICE VISIT (OUTPATIENT)
Facility: CLINIC | Age: 35
End: 2025-06-30
Payer: COMMERCIAL

## 2025-06-30 VITALS
HEIGHT: 62 IN | TEMPERATURE: 98 F | BODY MASS INDEX: 31.93 KG/M2 | DIASTOLIC BLOOD PRESSURE: 60 MMHG | WEIGHT: 173.5 LBS | SYSTOLIC BLOOD PRESSURE: 110 MMHG | RESPIRATION RATE: 18 BRPM

## 2025-06-30 DIAGNOSIS — N89.8 VAGINAL ITCHING: Primary | ICD-10-CM

## 2025-06-30 DIAGNOSIS — M25.511 ACUTE PAIN OF RIGHT SHOULDER: ICD-10-CM

## 2025-06-30 DIAGNOSIS — E78.00 SERUM CHOLESTEROL ELEVATED: ICD-10-CM

## 2025-06-30 DIAGNOSIS — F41.1 GENERALIZED ANXIETY DISORDER: ICD-10-CM

## 2025-06-30 PROCEDURE — 99214 OFFICE O/P EST MOD 30 MIN: CPT | Mod: S$GLB,,,

## 2025-06-30 PROCEDURE — 1160F RVW MEDS BY RX/DR IN RCRD: CPT | Mod: CPTII,S$GLB,,

## 2025-06-30 PROCEDURE — 81515 NFCT DS BV&VAGINITIS DNA ALG: CPT

## 2025-06-30 PROCEDURE — 1159F MED LIST DOCD IN RCRD: CPT | Mod: CPTII,S$GLB,,

## 2025-06-30 PROCEDURE — 3008F BODY MASS INDEX DOCD: CPT | Mod: CPTII,S$GLB,,

## 2025-06-30 PROCEDURE — 3078F DIAST BP <80 MM HG: CPT | Mod: CPTII,S$GLB,,

## 2025-06-30 PROCEDURE — 3044F HG A1C LEVEL LT 7.0%: CPT | Mod: CPTII,S$GLB,,

## 2025-06-30 PROCEDURE — 3074F SYST BP LT 130 MM HG: CPT | Mod: CPTII,S$GLB,,

## 2025-06-30 PROCEDURE — 99999 PR PBB SHADOW E&M-EST. PATIENT-LVL IV: CPT | Mod: PBBFAC,,,

## 2025-06-30 NOTE — PROGRESS NOTES
SUBJECTIVE     History of Present Illness    CHIEF COMPLAINT:  Ms. Daily presents today for follow up of right shoulder pain, anxiety, and vaginal itching    MUSCULOSKELETAL:  She reports intermittent right shoulder pain that has improved. She currently notes reduced pain perception and denies significant discomfort or acute pain during this encounter.    ANXIETY:  Her anxiety is well-controlled and she reports feeling less stressed. She continues Lexapro, Abilify, and Xanax for management and denies need for medication adjustment.    GYNECOLOGIC:  She reports persistent vaginal itching without discharge, despite Diflucan treatment one month ago. She describes a prior episode with blisters and irritated skin in the vaginal area. She has switched to unscented soap for bathing. She is sexually active with unprotected encounters.     LABS:  Recent STD screening was negative for HIV, hepatitis, syphilis, gonorrhea, and chlamydia. Vaginosis screen was positive for yeast. Thyroid function and hemoglobin A1C were normal. Fasting lipid panel showed elevated cholesterol at 235 mg/dL.      ROS:  General: -fever, -chills, -fatigue, -weight gain, -weight loss  Eyes: -vision changes, -redness, -discharge  ENT: -ear pain, -nasal congestion, -sore throat  Cardiovascular: -chest pain, -palpitations, -lower extremity edema  Respiratory: -cough, -shortness of breath  Gastrointestinal: -abdominal pain, -nausea, -vomiting, -diarrhea, -constipation, -blood in stool  Genitourinary: -dysuria, -hematuria, -frequency  Musculoskeletal: -joint pain, -muscle pain  Skin: -rash, -lesion  Neurological: -headache, -dizziness, -numbness, -tingling  Psychiatric: +anxiety, -depression, -sleep difficulty  Female Genitourinary: +vaginal itching or burning           PAST MEDICAL HISTORY:  Past Medical History:   Diagnosis Date    Anxiety     Depression        ALLERGIES AND MEDICATIONS: updated and reviewed.  Review of patient's allergies indicates:  "  Allergen Reactions    Penicillins      unknown    Prozac [fluoxetine] Other (See Comments)     Suicide thoughts     Current Medications[1]        OBJECTIVE     Physical Exam  Vitals:    06/30/25 1341   BP: 110/60   Resp: 18   Temp: 98 °F (36.7 °C)    Body mass index is 31.73 kg/m².  Weight: 78.7 kg (173 lb 8 oz)   Height: 5' 2" (157.5 cm)     Physical Exam  Vitals reviewed.   Constitutional:       General: She is not in acute distress.  HENT:      Right Ear: External ear normal.      Left Ear: External ear normal.      Nose: Nose normal.      Mouth/Throat:      Mouth: Mucous membranes are moist.   Eyes:      Extraocular Movements: Extraocular movements intact.      Conjunctiva/sclera: Conjunctivae normal.      Pupils: Pupils are equal, round, and reactive to light.   Pulmonary:      Effort: Pulmonary effort is normal.   Abdominal:      General: There is no distension.      Palpations: Abdomen is soft.   Musculoskeletal:         General: No swelling. Normal range of motion.      Cervical back: Normal range of motion.   Skin:     General: Skin is warm and dry.      Findings: No rash.   Neurological:      General: No focal deficit present.      Mental Status: She is alert and oriented to person, place, and time.   Psychiatric:         Mood and Affect: Mood normal.         Behavior: Behavior normal.         Thought Content: Thought content does not include homicidal or suicidal ideation.            Health Maintenance         Date Due Completion Date    Pneumococcal Vaccines (Age 0-49) (1 of 2 - PCV) Never done ---    COVID-19 Vaccine (2 - 2024-25 season) 09/01/2024 5/1/2021    Influenza Vaccine (Season Ended) 09/01/2025 11/11/2022    TETANUS VACCINE 08/30/2026 8/30/2016    Cervical Cancer Screening 01/15/2030 1/15/2025    RSV Vaccine (Age 60+ and Pregnant patients) (1 - 1-dose 75+ series) 11/18/2067 ---              ASSESSMENT     34 y.o. female with     1. Vaginal itching    2. Serum cholesterol elevated    3. Acute " pain of right shoulder    4. Generalized anxiety disorder        PLAN:     1. Vaginal itching    - Vaginosis Screen by DNA Probe    2. Serum cholesterol elevated      3. Acute pain of right shoulder  Improved, continue current plan and medications    4. Generalized anxiety disorder  - Stable; no acute issues, denies SI/HI  - The current medical regimen is effective;  continue present plan and medications.       Assessment & Plan    Anxiety controlled.  Total cholesterol elevated at 235 mg/dL.  Decided against medication changes due to current mental health stability.  No immediate need for cholesterol-lowering medication.  Vaginal itching without discharge.  Will perform vaginal swab to rule out persistent yeast infection before prescribing treatment.  Discussed that triglycerides were not elevated.    RIGHT SHOULDER PAIN:  - Ms. Daily reports improvement with no current pain and feeling less stressed.  - Clinical evaluation confirms resolution of right shoulder pain.    ANXIETY DISORDER:  - Ms. Daily's anxiety is well-controlled and currently well-managed based on mental status evaluation.    CHRONIC CANDIDIASIS:  - Ms. Daily experiencing itching without discharge.  - Vaginosis screen 1 month  showed presence of yeast, which was treated with diflucan.  - Ordered vaginosis swab test for further evaluation.  - Will initiate treatment with Diflucan, 1 tablet every other day for 2 doses, after swab results are received.    HYPERLIPIDEMIA:  - Lipid panel shows elevated cholesterol at 235 (above normal high end of 200), though triglycerides are within normal limits.  - Discussed that Abilify, an atypical antipsychotic, can potentially increase total and LDL cholesterol levels.  - Advised patient that hyperlipidemia is often related to diet and insufficient physical activity.  - Recommend dietary modifications including reducing fried foods, bread, rice, pasta, and simple sugars while adopting a low cholesterol diet.  -  Advised patient to discuss cholesterol concerns with mental health provider due to potential medication effects.  - Scheduled follow up in 1 year for cholesterol level recheck.      FOLLOW-UP:  - Scheduled follow up in 6 months, or sooner if needed.  - Ms. Daily instructed to exercise for 30 minutes daily, 4-5 days a week.         MIROSLAVA Magana  06/30/2025 3:29 PM      Follow up in about 6 months (around 12/30/2025).    I spent a total of 30 minutes on the day of the visit.  This includes face to face time and non-face to face time preparing to see the patient (eg, review of tests), obtaining and/or reviewing separately obtained history, documenting clinical information in the electronic or other health record, independently interpreting results and communicating results to the patient/family/caregiver, or care coordinator.   This note was generated with the assistance of ambient listening technology. Verbal consent was obtained by the patient and accompanying visitor(s) for the recording of patient appointment to facilitate this note. I attest to having reviewed and edited the generated note for accuracy, though some syntax or spelling errors may persist. Please contact the author of this note for any clarification.                 [1]   Current Outpatient Medications   Medication Sig Dispense Refill    ALPRAZolam (XANAX) 0.5 MG tablet       ARIPiprazole 10 mg tbsp       escitalopram oxalate (LEXAPRO) 10 MG tablet TK 1 T PO QD  0    ibuprofen (ADVIL,MOTRIN) 800 MG tablet Take 1 tablet (800 mg total) by mouth every 8 (eight) hours as needed for Pain. 30 tablet 1    mupirocin (BACTROBAN) 2 % ointment Apply topically 3 (three) times daily. 22 g 0    lactic acid-citric-potassium (PHEXXI) 1.8-1-0.4 % Gel Place 1 applicator vaginally as needed (Right before or up to one hour before sex). (Patient not taking: Reported on 6/30/2025) 120 g 3     No current facility-administered medications for this visit.

## 2025-07-05 LAB
BACTERIAL VAGINOSIS DNA (OHS): NOT DETECTED
CANDIDA GLABRATA/KRUSEI DNA (OHS): NOT DETECTED
CANDIDA SPECIES DNA (OHS): DETECTED
TRICHOMONAS VAGINALIS DNA (OHS): NOT DETECTED

## 2025-07-07 ENCOUNTER — RESULTS FOLLOW-UP (OUTPATIENT)
Facility: CLINIC | Age: 35
End: 2025-07-07

## 2025-07-10 ENCOUNTER — PROCEDURE VISIT (OUTPATIENT)
Dept: OBSTETRICS AND GYNECOLOGY | Facility: CLINIC | Age: 35
End: 2025-07-10
Payer: COMMERCIAL

## 2025-07-10 VITALS — WEIGHT: 171.5 LBS | DIASTOLIC BLOOD PRESSURE: 81 MMHG | SYSTOLIC BLOOD PRESSURE: 119 MMHG | BODY MASS INDEX: 31.37 KG/M2

## 2025-07-10 DIAGNOSIS — Z30.46 ENCOUNTER FOR NEXPLANON REMOVAL: Primary | ICD-10-CM

## 2025-07-10 LAB
B-HCG UR QL: NEGATIVE
CTP QC/QA: YES

## 2025-07-10 PROCEDURE — 11982 REMOVE DRUG IMPLANT DEVICE: CPT | Mod: S$GLB,,,

## 2025-07-10 PROCEDURE — 81025 URINE PREGNANCY TEST: CPT | Mod: S$GLB,,,

## 2025-07-10 PROCEDURE — 99499 UNLISTED E&M SERVICE: CPT | Mod: S$GLB,,,

## 2025-07-10 NOTE — PROCEDURES
Removal of Nexplanon Device    Date/Time: 7/10/2025 10:20 AM    Performed by: Eliana Cool WHNP  Authorized by: Eliana Cool WHNP    Consent obtained:  Prior to procedure the appropriate consent was completed and verified  Consent given by:  Patient  Procedure risks and benefits discussed: yes    Patient questions answered: yes    Patient agrees, verbalizes understanding, and wants to proceed: yes    Instructions and paperwork completed: yes    Implant grasped by: hemostat  Removed with no complications: yes       The procedure and minimal risks of pain, bleeding, bruising and infection at the insertion site discussed.   Written information provided; all questions answered and patient agrees to proceed.  Consent signed and scanned into computer.    UPT neg      Manage arm pain with NSAIDs, Tylenol or Rx   Keep arm elevated and apply intermittent ice packs to decrease pain and bruising for 24 Hours.  May remove bandage in 24 hours.      Removal due to expiration: yes    Arm: left arm  Palpation confirms location: yes  Small stab incision was made in arm: yes  Upon removal device was intact: yes  Site was close with steri-strips and pressure bandage applied: yes  Pre-procedure timeout performed: yes  Prepped with:  povidone-iodine 7.5% surgical scrub and alcohol 70%  Local anesthetic:  Lidocaine with epinephrine   The site was cleaned  and prepped in a sterile fashion: yes  Specimen sent to pathology: Yes

## 2025-07-14 DIAGNOSIS — B37.31 VAGINAL YEAST INFECTION: ICD-10-CM

## 2025-07-14 RX ORDER — FLUCONAZOLE 150 MG/1
150 TABLET ORAL
Qty: 2 TABLET | Refills: 0 | Status: SHIPPED | OUTPATIENT
Start: 2025-07-14 | End: 2025-07-20